# Patient Record
Sex: FEMALE | Race: WHITE | NOT HISPANIC OR LATINO
[De-identification: names, ages, dates, MRNs, and addresses within clinical notes are randomized per-mention and may not be internally consistent; named-entity substitution may affect disease eponyms.]

---

## 2018-08-09 PROBLEM — Z00.00 ENCOUNTER FOR PREVENTIVE HEALTH EXAMINATION: Status: ACTIVE | Noted: 2018-08-09

## 2018-08-15 ENCOUNTER — TRANSCRIPTION ENCOUNTER (OUTPATIENT)
Age: 68
End: 2018-08-15

## 2018-08-16 ENCOUNTER — OUTPATIENT (OUTPATIENT)
Dept: OUTPATIENT SERVICES | Facility: HOSPITAL | Age: 68
LOS: 1 days | End: 2018-08-16
Payer: MEDICARE

## 2018-08-16 ENCOUNTER — TRANSCRIPTION ENCOUNTER (OUTPATIENT)
Age: 68
End: 2018-08-16

## 2018-08-16 ENCOUNTER — RESULT REVIEW (OUTPATIENT)
Age: 68
End: 2018-08-16

## 2018-08-16 DIAGNOSIS — R19.7 DIARRHEA, UNSPECIFIED: ICD-10-CM

## 2018-08-16 DIAGNOSIS — K21.0 GASTRO-ESOPHAGEAL REFLUX DISEASE WITH ESOPHAGITIS: ICD-10-CM

## 2018-08-16 PROCEDURE — 88305 TISSUE EXAM BY PATHOLOGIST: CPT

## 2018-08-16 PROCEDURE — 43239 EGD BIOPSY SINGLE/MULTIPLE: CPT

## 2018-08-16 PROCEDURE — 88313 SPECIAL STAINS GROUP 2: CPT

## 2018-08-16 PROCEDURE — 45378 DIAGNOSTIC COLONOSCOPY: CPT

## 2018-08-16 PROCEDURE — 88312 SPECIAL STAINS GROUP 1: CPT | Mod: 26

## 2018-08-16 PROCEDURE — 88305 TISSUE EXAM BY PATHOLOGIST: CPT | Mod: 26

## 2018-08-16 PROCEDURE — 88312 SPECIAL STAINS GROUP 1: CPT

## 2018-08-16 PROCEDURE — 88313 SPECIAL STAINS GROUP 2: CPT | Mod: 26

## 2018-08-17 LAB — SURGICAL PATHOLOGY FINAL REPORT - CH: SIGNIFICANT CHANGE UP

## 2019-04-20 ENCOUNTER — TRANSCRIPTION ENCOUNTER (OUTPATIENT)
Age: 69
End: 2019-04-20

## 2019-12-06 ENCOUNTER — INPATIENT (INPATIENT)
Facility: HOSPITAL | Age: 69
LOS: 5 days | Discharge: HOME | DRG: 373 | End: 2019-12-12
Attending: FAMILY MEDICINE | Admitting: STUDENT IN AN ORGANIZED HEALTH CARE EDUCATION/TRAINING PROGRAM
Payer: MEDICARE

## 2019-12-06 VITALS
SYSTOLIC BLOOD PRESSURE: 177 MMHG | DIASTOLIC BLOOD PRESSURE: 77 MMHG | HEART RATE: 101 BPM | OXYGEN SATURATION: 98 % | TEMPERATURE: 98 F | RESPIRATION RATE: 18 BRPM | WEIGHT: 169.98 LBS

## 2019-12-06 LAB
ALBUMIN SERPL ELPH-MCNC: 4 G/DL — SIGNIFICANT CHANGE UP (ref 3.3–5)
ALT FLD-CCNC: 28 U/L — SIGNIFICANT CHANGE UP (ref 12–78)
ANION GAP SERPL CALC-SCNC: 5 MMOL/L — SIGNIFICANT CHANGE UP (ref 5–17)
APTT BLD: 27.4 SEC — LOW (ref 28.5–37)
AST SERPL-CCNC: 21 U/L — SIGNIFICANT CHANGE UP (ref 15–37)
BUN SERPL-MCNC: 18 MG/DL — SIGNIFICANT CHANGE UP (ref 7–23)
CALCIUM SERPL-MCNC: 8.5 MG/DL — SIGNIFICANT CHANGE UP (ref 8.5–10.1)
CHLORIDE SERPL-SCNC: 110 MMOL/L — HIGH (ref 96–108)
CO2 SERPL-SCNC: 28 MMOL/L — SIGNIFICANT CHANGE UP (ref 22–31)
CREAT SERPL-MCNC: 1.2 MG/DL — SIGNIFICANT CHANGE UP (ref 0.5–1.3)
GLUCOSE SERPL-MCNC: 115 MG/DL — HIGH (ref 70–99)
HCT VFR BLD CALC: 42.5 % — SIGNIFICANT CHANGE UP (ref 34.5–45)
HGB BLD-MCNC: 14 G/DL — SIGNIFICANT CHANGE UP (ref 11.5–15.5)
INR BLD: 0.95 RATIO — SIGNIFICANT CHANGE UP (ref 0.88–1.16)
MCHC RBC-ENTMCNC: 31.1 PG — SIGNIFICANT CHANGE UP (ref 27–34)
MCHC RBC-ENTMCNC: 32.9 GM/DL — SIGNIFICANT CHANGE UP (ref 32–36)
MCV RBC AUTO: 94.4 FL — SIGNIFICANT CHANGE UP (ref 80–100)
NRBC # BLD: 0 /100 WBCS — SIGNIFICANT CHANGE UP (ref 0–0)
PLATELET # BLD AUTO: 271 K/UL — SIGNIFICANT CHANGE UP (ref 150–400)
POTASSIUM SERPL-MCNC: 3.9 MMOL/L — SIGNIFICANT CHANGE UP (ref 3.5–5.3)
POTASSIUM SERPL-SCNC: 3.9 MMOL/L — SIGNIFICANT CHANGE UP (ref 3.5–5.3)
PROTHROM AB SERPL-ACNC: 10.7 SEC — SIGNIFICANT CHANGE UP (ref 10–12.9)
RBC # BLD: 4.5 M/UL — SIGNIFICANT CHANGE UP (ref 3.8–5.2)
RBC # FLD: 13.5 % — SIGNIFICANT CHANGE UP (ref 10.3–14.5)
SODIUM SERPL-SCNC: 143 MMOL/L — SIGNIFICANT CHANGE UP (ref 135–145)
WBC # BLD: 9.73 K/UL — SIGNIFICANT CHANGE UP (ref 3.8–10.5)
WBC # FLD AUTO: 9.73 K/UL — SIGNIFICANT CHANGE UP (ref 3.8–10.5)

## 2019-12-06 PROCEDURE — 71045 X-RAY EXAM CHEST 1 VIEW: CPT | Mod: 26

## 2019-12-06 PROCEDURE — 93010 ELECTROCARDIOGRAM REPORT: CPT

## 2019-12-06 RX ORDER — SODIUM CHLORIDE 9 MG/ML
1000 INJECTION INTRAMUSCULAR; INTRAVENOUS; SUBCUTANEOUS ONCE
Refills: 0 | Status: COMPLETED | OUTPATIENT
Start: 2019-12-06 | End: 2019-12-06

## 2019-12-06 RX ADMIN — SODIUM CHLORIDE 1000 MILLILITER(S): 9 INJECTION INTRAMUSCULAR; INTRAVENOUS; SUBCUTANEOUS at 23:48

## 2019-12-06 RX ADMIN — SODIUM CHLORIDE 1000 MILLILITER(S): 9 INJECTION INTRAMUSCULAR; INTRAVENOUS; SUBCUTANEOUS at 22:30

## 2019-12-06 RX ADMIN — SODIUM CHLORIDE 1000 MILLILITER(S): 9 INJECTION INTRAMUSCULAR; INTRAVENOUS; SUBCUTANEOUS at 23:46

## 2019-12-06 NOTE — ED PROVIDER NOTE - CLINICAL SUMMARY MEDICAL DECISION MAKING FREE TEXT BOX
pt is a 68 yo female hx of ibs and frequent episodes of syncope after eating and getting cramps,  pt today ate and with severe abdominal cramps, nausea. pt went to sit on toilet which is typical for pt and then got diffuse sweats, dizziness and then layed head against wall and had syncope with no trauma. pt then had diarrhea with scant mucous and blood and vomitted several times. pt with continued abd cramps. which is not typical for patient.  sx usually resolve,  no cp,sobk ha.  check ekg, labs, ivf,  check ct abd to ro colitis,  ce x 1

## 2019-12-06 NOTE — ED ADULT NURSE NOTE - OBJECTIVE STATEMENT
Received from Home with c/o one episode of Vomiting, bloody diarrhea and near syncopal episode at a restaurant. No [place], LOC. Hx of IBS on meds. AO4, Ambulatory.  at bedside. On monitor. EKG Done. Line and labs done. Skin intact.

## 2019-12-06 NOTE — ED PROVIDER NOTE - OBJECTIVE STATEMENT
pt is a 68 yo female hx of ibs and frequent episodes of syncope after eating and getting cramps,  pt today ate and with severe abdominal cramps, nausea. pt went to sit on toilet which is typical for pt and then got diffuse sweats, dizziness and then layed head against wall and had syncope with no trauma. pt then had diarrhea with scant mucous and blood and vomitted several times. pt with continued abd cramps. which is not typical for patient.  sx usually resolve,    pmd; jennifer

## 2019-12-06 NOTE — ED PROVIDER NOTE - FAMILY HISTORY
Mother  Still living? No  Family history of COPD (chronic obstructive pulmonary disease), Age at diagnosis: Age Unknown     Father  Still living? No  Family history of acute renal failure, Age at diagnosis: Age Unknown     Sibling  Still living? Yes, Estimated age: 51-60  Family history of systemic lupus erythematosus, Age at diagnosis: 51-60

## 2019-12-06 NOTE — ED PROVIDER NOTE - CARE PLAN
Principal Discharge DX:	Vasovagal syncope  Secondary Diagnosis:	Abdominal pain Principal Discharge DX:	Vasovagal syncope  Secondary Diagnosis:	Abdominal pain  Secondary Diagnosis:	GIB (gastrointestinal bleeding)

## 2019-12-06 NOTE — ED ADULT TRIAGE NOTE - CHIEF COMPLAINT QUOTE
patient came in ED from home with c/o an episode of bloody diarrhea, diaphoresis and syncope X this evening.

## 2019-12-06 NOTE — ED PROVIDER NOTE - CONSTITUTIONAL, MLM
normal... Well appearing, awake, alert, oriented to person, place, time/situation and in mild distress with abd cramps

## 2019-12-07 DIAGNOSIS — F41.9 ANXIETY DISORDER, UNSPECIFIED: ICD-10-CM

## 2019-12-07 DIAGNOSIS — Z71.89 OTHER SPECIFIED COUNSELING: ICD-10-CM

## 2019-12-07 DIAGNOSIS — R93.89 ABNORMAL FINDINGS ON DIAGNOSTIC IMAGING OF OTHER SPECIFIED BODY STRUCTURES: ICD-10-CM

## 2019-12-07 DIAGNOSIS — I10 ESSENTIAL (PRIMARY) HYPERTENSION: ICD-10-CM

## 2019-12-07 DIAGNOSIS — Z98.890 OTHER SPECIFIED POSTPROCEDURAL STATES: Chronic | ICD-10-CM

## 2019-12-07 DIAGNOSIS — E03.9 HYPOTHYROIDISM, UNSPECIFIED: ICD-10-CM

## 2019-12-07 DIAGNOSIS — R55 SYNCOPE AND COLLAPSE: ICD-10-CM

## 2019-12-07 DIAGNOSIS — K92.2 GASTROINTESTINAL HEMORRHAGE, UNSPECIFIED: ICD-10-CM

## 2019-12-07 DIAGNOSIS — Z29.9 ENCOUNTER FOR PROPHYLACTIC MEASURES, UNSPECIFIED: ICD-10-CM

## 2019-12-07 DIAGNOSIS — K58.9 IRRITABLE BOWEL SYNDROME WITHOUT DIARRHEA: ICD-10-CM

## 2019-12-07 DIAGNOSIS — K52.9 NONINFECTIVE GASTROENTERITIS AND COLITIS, UNSPECIFIED: ICD-10-CM

## 2019-12-07 LAB
ALBUMIN SERPL ELPH-MCNC: 3.9 G/DL — SIGNIFICANT CHANGE UP (ref 3.3–5)
ALP SERPL-CCNC: 91 U/L — SIGNIFICANT CHANGE UP (ref 40–120)
ALP SERPL-CCNC: 93 U/L — SIGNIFICANT CHANGE UP (ref 40–120)
ALT FLD-CCNC: 26 U/L — SIGNIFICANT CHANGE UP (ref 12–78)
ANION GAP SERPL CALC-SCNC: 5 MMOL/L — SIGNIFICANT CHANGE UP (ref 5–17)
ANION GAP SERPL CALC-SCNC: 6 MMOL/L — SIGNIFICANT CHANGE UP (ref 5–17)
AST SERPL-CCNC: 21 U/L — SIGNIFICANT CHANGE UP (ref 15–37)
BILIRUB SERPL-MCNC: 0.2 MG/DL — SIGNIFICANT CHANGE UP (ref 0.2–1.2)
BILIRUB SERPL-MCNC: 0.3 MG/DL — SIGNIFICANT CHANGE UP (ref 0.2–1.2)
BUN SERPL-MCNC: 13 MG/DL — SIGNIFICANT CHANGE UP (ref 7–23)
BUN SERPL-MCNC: 19 MG/DL — SIGNIFICANT CHANGE UP (ref 7–23)
CALCIUM SERPL-MCNC: 8 MG/DL — LOW (ref 8.5–10.1)
CALCIUM SERPL-MCNC: 8.2 MG/DL — LOW (ref 8.5–10.1)
CHLORIDE SERPL-SCNC: 110 MMOL/L — HIGH (ref 96–108)
CHLORIDE SERPL-SCNC: 112 MMOL/L — HIGH (ref 96–108)
CK MB BLD-MCNC: <1.3 % — SIGNIFICANT CHANGE UP (ref 0–3.5)
CK MB CFR SERPL CALC: <1 NG/ML — SIGNIFICANT CHANGE UP (ref 0–3.6)
CK SERPL-CCNC: 78 U/L — SIGNIFICANT CHANGE UP (ref 26–192)
CO2 SERPL-SCNC: 27 MMOL/L — SIGNIFICANT CHANGE UP (ref 22–31)
CO2 SERPL-SCNC: 27 MMOL/L — SIGNIFICANT CHANGE UP (ref 22–31)
CREAT SERPL-MCNC: 0.96 MG/DL — SIGNIFICANT CHANGE UP (ref 0.5–1.3)
CREAT SERPL-MCNC: 1.2 MG/DL — SIGNIFICANT CHANGE UP (ref 0.5–1.3)
GLUCOSE SERPL-MCNC: 113 MG/DL — HIGH (ref 70–99)
GLUCOSE SERPL-MCNC: 124 MG/DL — HIGH (ref 70–99)
HCT VFR BLD CALC: 38.8 % — SIGNIFICANT CHANGE UP (ref 34.5–45)
HCT VFR BLD CALC: 39 % — SIGNIFICANT CHANGE UP (ref 34.5–45)
HGB BLD-MCNC: 12.7 G/DL — SIGNIFICANT CHANGE UP (ref 11.5–15.5)
HGB BLD-MCNC: 12.8 G/DL — SIGNIFICANT CHANGE UP (ref 11.5–15.5)
LACTATE SERPL-SCNC: 0.7 MMOL/L — SIGNIFICANT CHANGE UP (ref 0.7–2)
LIDOCAIN IGE QN: 170 U/L — SIGNIFICANT CHANGE UP (ref 73–393)
MCHC RBC-ENTMCNC: 31.1 PG — SIGNIFICANT CHANGE UP (ref 27–34)
MCHC RBC-ENTMCNC: 32.6 GM/DL — SIGNIFICANT CHANGE UP (ref 32–36)
MCV RBC AUTO: 95.6 FL — SIGNIFICANT CHANGE UP (ref 80–100)
NRBC # BLD: 0 /100 WBCS — SIGNIFICANT CHANGE UP (ref 0–0)
PLATELET # BLD AUTO: 255 K/UL — SIGNIFICANT CHANGE UP (ref 150–400)
POTASSIUM SERPL-MCNC: 3.8 MMOL/L — SIGNIFICANT CHANGE UP (ref 3.5–5.3)
POTASSIUM SERPL-MCNC: 4.3 MMOL/L — SIGNIFICANT CHANGE UP (ref 3.5–5.3)
POTASSIUM SERPL-SCNC: 3.8 MMOL/L — SIGNIFICANT CHANGE UP (ref 3.5–5.3)
POTASSIUM SERPL-SCNC: 4.3 MMOL/L — SIGNIFICANT CHANGE UP (ref 3.5–5.3)
PROT SERPL-MCNC: 7.3 G/DL — SIGNIFICANT CHANGE UP (ref 6–8.3)
PROT SERPL-MCNC: 7.4 G/DL — SIGNIFICANT CHANGE UP (ref 6–8.3)
RBC # BLD: 4.08 M/UL — SIGNIFICANT CHANGE UP (ref 3.8–5.2)
RBC # FLD: 13.3 % — SIGNIFICANT CHANGE UP (ref 10.3–14.5)
SODIUM SERPL-SCNC: 142 MMOL/L — SIGNIFICANT CHANGE UP (ref 135–145)
SODIUM SERPL-SCNC: 145 MMOL/L — SIGNIFICANT CHANGE UP (ref 135–145)
TROPONIN I SERPL-MCNC: <.015 NG/ML — SIGNIFICANT CHANGE UP (ref 0.01–0.04)
WBC # BLD: 7.71 K/UL — SIGNIFICANT CHANGE UP (ref 3.8–10.5)
WBC # FLD AUTO: 7.71 K/UL — SIGNIFICANT CHANGE UP (ref 3.8–10.5)

## 2019-12-07 PROCEDURE — 74177 CT ABD & PELVIS W/CONTRAST: CPT | Mod: 26

## 2019-12-07 PROCEDURE — 99285 EMERGENCY DEPT VISIT HI MDM: CPT

## 2019-12-07 RX ORDER — MORPHINE SULFATE 50 MG/1
1 CAPSULE, EXTENDED RELEASE ORAL EVERY 4 HOURS
Refills: 0 | Status: DISCONTINUED | OUTPATIENT
Start: 2019-12-07 | End: 2019-12-12

## 2019-12-07 RX ORDER — PANTOPRAZOLE SODIUM 20 MG/1
40 TABLET, DELAYED RELEASE ORAL
Refills: 0 | Status: DISCONTINUED | OUTPATIENT
Start: 2019-12-07 | End: 2019-12-12

## 2019-12-07 RX ORDER — ONDANSETRON 8 MG/1
4 TABLET, FILM COATED ORAL EVERY 6 HOURS
Refills: 0 | Status: DISCONTINUED | OUTPATIENT
Start: 2019-12-07 | End: 2019-12-12

## 2019-12-07 RX ORDER — ONDANSETRON 8 MG/1
4 TABLET, FILM COATED ORAL ONCE
Refills: 0 | Status: DISCONTINUED | OUTPATIENT
Start: 2019-12-07 | End: 2019-12-07

## 2019-12-07 RX ORDER — ONDANSETRON 8 MG/1
4 TABLET, FILM COATED ORAL ONCE
Refills: 0 | Status: COMPLETED | OUTPATIENT
Start: 2019-12-07 | End: 2019-12-07

## 2019-12-07 RX ORDER — AMLODIPINE BESYLATE 2.5 MG/1
2.5 TABLET ORAL DAILY
Refills: 0 | Status: DISCONTINUED | OUTPATIENT
Start: 2019-12-07 | End: 2019-12-12

## 2019-12-07 RX ORDER — BUPROPION HYDROCHLORIDE 150 MG/1
300 TABLET, EXTENDED RELEASE ORAL DAILY
Refills: 0 | Status: DISCONTINUED | OUTPATIENT
Start: 2019-12-07 | End: 2019-12-12

## 2019-12-07 RX ORDER — SODIUM CHLORIDE 9 MG/ML
1000 INJECTION INTRAMUSCULAR; INTRAVENOUS; SUBCUTANEOUS ONCE
Refills: 0 | Status: COMPLETED | OUTPATIENT
Start: 2019-12-07 | End: 2019-12-07

## 2019-12-07 RX ORDER — SODIUM CHLORIDE 9 MG/ML
1000 INJECTION, SOLUTION INTRAVENOUS
Refills: 0 | Status: DISCONTINUED | OUTPATIENT
Start: 2019-12-07 | End: 2019-12-08

## 2019-12-07 RX ORDER — MORPHINE SULFATE 50 MG/1
1 CAPSULE, EXTENDED RELEASE ORAL EVERY 4 HOURS
Refills: 0 | Status: DISCONTINUED | OUTPATIENT
Start: 2019-12-07 | End: 2019-12-07

## 2019-12-07 RX ORDER — METRONIDAZOLE 500 MG
500 TABLET ORAL ONCE
Refills: 0 | Status: COMPLETED | OUTPATIENT
Start: 2019-12-07 | End: 2019-12-07

## 2019-12-07 RX ORDER — METRONIDAZOLE 500 MG
500 TABLET ORAL EVERY 8 HOURS
Refills: 0 | Status: DISCONTINUED | OUTPATIENT
Start: 2019-12-07 | End: 2019-12-12

## 2019-12-07 RX ORDER — AMLODIPINE BESYLATE 2.5 MG/1
1 TABLET ORAL
Qty: 0 | Refills: 0 | DISCHARGE

## 2019-12-07 RX ORDER — BUPROPION HYDROCHLORIDE 150 MG/1
300 TABLET, EXTENDED RELEASE ORAL
Qty: 0 | Refills: 0 | DISCHARGE

## 2019-12-07 RX ORDER — SODIUM CHLORIDE 9 MG/ML
1000 INJECTION INTRAMUSCULAR; INTRAVENOUS; SUBCUTANEOUS
Refills: 0 | Status: DISCONTINUED | OUTPATIENT
Start: 2019-12-07 | End: 2019-12-07

## 2019-12-07 RX ORDER — LEVOTHYROXINE SODIUM 125 MCG
1 TABLET ORAL
Qty: 0 | Refills: 0 | DISCHARGE

## 2019-12-07 RX ORDER — CEFTRIAXONE 500 MG/1
2000 INJECTION, POWDER, FOR SOLUTION INTRAMUSCULAR; INTRAVENOUS EVERY 24 HOURS
Refills: 0 | Status: DISCONTINUED | OUTPATIENT
Start: 2019-12-07 | End: 2019-12-12

## 2019-12-07 RX ORDER — MORPHINE SULFATE 50 MG/1
2 CAPSULE, EXTENDED RELEASE ORAL ONCE
Refills: 0 | Status: DISCONTINUED | OUTPATIENT
Start: 2019-12-07 | End: 2019-12-07

## 2019-12-07 RX ORDER — METRONIDAZOLE 500 MG
TABLET ORAL
Refills: 0 | Status: DISCONTINUED | OUTPATIENT
Start: 2019-12-07 | End: 2019-12-12

## 2019-12-07 RX ORDER — LEVOTHYROXINE SODIUM 125 MCG
75 TABLET ORAL DAILY
Refills: 0 | Status: DISCONTINUED | OUTPATIENT
Start: 2019-12-07 | End: 2019-12-12

## 2019-12-07 RX ORDER — ONDANSETRON 8 MG/1
4 TABLET, FILM COATED ORAL EVERY 6 HOURS
Refills: 0 | Status: DISCONTINUED | OUTPATIENT
Start: 2019-12-07 | End: 2019-12-07

## 2019-12-07 RX ADMIN — SODIUM CHLORIDE 75 MILLILITER(S): 9 INJECTION, SOLUTION INTRAVENOUS at 11:08

## 2019-12-07 RX ADMIN — Medication 100 MILLIGRAM(S): at 06:00

## 2019-12-07 RX ADMIN — AMLODIPINE BESYLATE 2.5 MILLIGRAM(S): 2.5 TABLET ORAL at 06:56

## 2019-12-07 RX ADMIN — ONDANSETRON 4 MILLIGRAM(S): 8 TABLET, FILM COATED ORAL at 06:56

## 2019-12-07 RX ADMIN — CEFTRIAXONE 100 MILLIGRAM(S): 500 INJECTION, POWDER, FOR SOLUTION INTRAMUSCULAR; INTRAVENOUS at 05:29

## 2019-12-07 RX ADMIN — MORPHINE SULFATE 1 MILLIGRAM(S): 50 CAPSULE, EXTENDED RELEASE ORAL at 14:23

## 2019-12-07 RX ADMIN — Medication 100 MILLIGRAM(S): at 14:18

## 2019-12-07 RX ADMIN — SODIUM CHLORIDE 75 MILLILITER(S): 9 INJECTION INTRAMUSCULAR; INTRAVENOUS; SUBCUTANEOUS at 05:36

## 2019-12-07 RX ADMIN — MORPHINE SULFATE 2 MILLIGRAM(S): 50 CAPSULE, EXTENDED RELEASE ORAL at 03:17

## 2019-12-07 RX ADMIN — SODIUM CHLORIDE 1000 MILLILITER(S): 9 INJECTION INTRAMUSCULAR; INTRAVENOUS; SUBCUTANEOUS at 03:17

## 2019-12-07 RX ADMIN — SODIUM CHLORIDE 1000 MILLILITER(S): 9 INJECTION INTRAMUSCULAR; INTRAVENOUS; SUBCUTANEOUS at 00:49

## 2019-12-07 RX ADMIN — Medication 10 MILLIGRAM(S): at 08:27

## 2019-12-07 RX ADMIN — ONDANSETRON 4 MILLIGRAM(S): 8 TABLET, FILM COATED ORAL at 03:16

## 2019-12-07 RX ADMIN — Medication 10 MILLIGRAM(S): at 06:55

## 2019-12-07 RX ADMIN — MORPHINE SULFATE 1 MILLIGRAM(S): 50 CAPSULE, EXTENDED RELEASE ORAL at 08:29

## 2019-12-07 RX ADMIN — ONDANSETRON 4 MILLIGRAM(S): 8 TABLET, FILM COATED ORAL at 14:23

## 2019-12-07 RX ADMIN — Medication 10 MILLIGRAM(S): at 16:21

## 2019-12-07 RX ADMIN — MORPHINE SULFATE 1 MILLIGRAM(S): 50 CAPSULE, EXTENDED RELEASE ORAL at 14:32

## 2019-12-07 RX ADMIN — BUPROPION HYDROCHLORIDE 300 MILLIGRAM(S): 150 TABLET, EXTENDED RELEASE ORAL at 11:08

## 2019-12-07 RX ADMIN — Medication 100 MILLIGRAM(S): at 21:28

## 2019-12-07 RX ADMIN — MORPHINE SULFATE 1 MILLIGRAM(S): 50 CAPSULE, EXTENDED RELEASE ORAL at 09:01

## 2019-12-07 RX ADMIN — Medication 75 MICROGRAM(S): at 06:56

## 2019-12-07 NOTE — H&P ADULT - NSHPOUTPATIENTPROVIDERS_GEN_ALL_CORE
PMD: Dr Zi Melgoza   Gastroenterologist:  PMD: Dr Zi Melgoza   Gastroenterologist: Dr Rossi  Cardio: Dr Mario

## 2019-12-07 NOTE — H&P ADULT - PROBLEM SELECTOR PLAN 1
Patient with bright red blood per rectum since last night, no known history of GI bleeds previously, last EGD/colonoscopy 2 years ago wnl, patient had two more episodes of BRBPR in ED (about 5-10 mL with some small amount of stool)  -Admit to telemetry   -s/p 3 L NS bolus in ED   -c/w fluids?   -PPI?   -Pain control: s/p Morphine 2 mg IVP x 1, continue   -Zofran PRN for nausea   -H/H: 14/42.5, unknown baseline, patient is hemodynamically stable   -Transfuse if Hgb < 8, or if patient becomes hemodynamically unstable   -f/u FOBT   -f/u AM CBC   -Patient will likely need colonoscopy/EGD, keep patient NPO in setting of GI bleed   -GI (Jorge) consulted; f/u recs -Patient with bright red blood per rectum since last night, no known history of GI bleeds previously  -ischemic vs infectious colitis vs ibs flare  - CT with findings of uncomplicated colitis  -pt with no gib with previous ibs flares  -last EGD/colonoscopy 2 years ago wnl  -patient had two more episodes of BRBPR in ED (about 5-10 mL with some small amount of stool)  -Admit to telemetry   - NPO   - type and screen, H&H currently 14/42.5, check stat H&H , check q 6 hours while actively bleeding.   - transfuse PRN for significant bleeding or hemodynamic instability.  -s/p 3 L NS bolus in ED , c/w NS @ 75cc x 12 hrs  -Pain control: s/p Morphine 2 mg IVP x 1. hold for now. restart bentyl. add morphine if pain inadequately controlled.   -Zofran PRN for nausea   -H/H: 14/42.5, unknown baseline, patient is hemodynamically stable   -Patient may need colonoscopy/EGD  -GI (Jorge) consulted; f/u recs

## 2019-12-07 NOTE — H&P ADULT - NSICDXPASTSURGICALHX_GEN_ALL_CORE_FT
PAST SURGICAL HISTORY:  History of colonoscopy     S/P arthroscopic knee surgery Left knee (1995)    S/P hernia repair

## 2019-12-07 NOTE — H&P ADULT - ASSESSMENT
Patient is a 70 y/o female with PMH of IBS and frequent vasovagal syncopal episodes, presenting with, admitted for management of GI bleed. Patient is a 68 y/o female with PMH of IBS, frequent vasovagal syncope after bowel movements, HTN, hypothyroidism. generalized anxiety disorder who presents c/o bright red blood per rectum since last night, admitted for management of GI bleed.

## 2019-12-07 NOTE — H&P ADULT - NSHPREVIEWOFSYSTEMS_GEN_ALL_CORE
CONSTITUTIONAL: denies fever, chills, fatigue, weakness  HEENT: denies blurred vision, sore throat  SKIN: denies new lesions, rash  CARDIOVASCULAR: denies chest pain, chest pressure, palpitations  RESPIRATORY: denies shortness of breath, sputum production  GASTROINTESTINAL: admits nausea, vomiting, bloody diarrhea, abdominal pain  GENITOURINARY: denies dysuria, discharge  NEUROLOGICAL: denies numbness, headache, focal weakness  MUSCULOSKELETAL: denies new joint pain, muscle aches  HEMATOLOGIC: denies gross bleeding, bruising  LYMPHATICS: denies enlarged lymph nodes, extremity swelling

## 2019-12-07 NOTE — H&P ADULT - PROBLEM SELECTOR PLAN 2
Patient with history of vasovagal syncope with bowel movements, has followed up with her cardiologist Dr Mario for workup of vasovagal syncope   -Syncope with this episode is most likely vasovagal, as it occurred while patient was straining herself while having a bowel movement, patient also with prodromal symptoms   -EKG wnl, Cardiac enzymes negative x 1, patient denies anginal symptoms: unlikely ischemic event   -Monitor for signs of ischemia Patient with history of vasovagal syncope with bowel movements, has followed up with her cardiologist Dr Mario for workup of vasovagal syncope   -Syncope with this episode is most likely vasovagal, as it occurred while patient was straining herself while having a bowel movement, patient also with prodromal symptoms   -EKG wnl, Cardiac enzymes negative x 1, patient denies anginal symptoms: unlikely ischemic event   -Monitor for signs of ischemia  - fall precautions, oob with assist

## 2019-12-07 NOTE — CONSULT NOTE ADULT - PROBLEM SELECTOR RECOMMENDATION 9
- ischemic vs infectious etiology   - agree with IV abx (cipro/flagyl)  - check stool studies (gi pcr)  - if diarrhea persists/worsens would check stool for C diff  - pt will need a colonoscopy in 6-8 weeks once resolution of infection/inflammation to r/o underlying malignancy  - clear liquid diet, will advance as tolerated

## 2019-12-07 NOTE — H&P ADULT - NSHPPHYSICALEXAM_GEN_ALL_CORE
T(C): 36.9 (12-06-19 @ 22:30), Max: 36.9 (12-06-19 @ 22:30)  HR: 103 (12-07-19 @ 03:00) (94 - 103)  BP: 132/75 (12-07-19 @ 03:00) (132/75 - 177/77)  RR: 16 (12-07-19 @ 03:00) (16 - 18)  SpO2: 97% (12-07-19 @ 03:00) (97% - 99%)    GENERAL: patient appears well, no acute distress, appropriate, pleasant  EYES: sclera clear, no exudates  ENMT: oropharynx clear without erythema, no exudates, moist mucous membranes  NECK: supple, soft, no thyromegaly noted  LUNGS: good air entry bilaterally, clear to auscultation, symmetric breath sounds, no wheezing or rhonchi appreciated  HEART: soft S1/S2, slightly tachycardic, no murmurs noted, no lower extremity edema  GASTROINTESTINAL: abdomen is soft, mild diffuse TTP, nondistended, normoactive bowel sounds, no palpable masses  INTEGUMENT: good skin turgor, warm skin, appears well perfused  MUSCULOSKELETAL: no clubbing or cyanosis, no obvious deformity  NEUROLOGIC: awake, alert, oriented x3, good muscle tone in 4 extremities, no obvious sensory deficits  PSYCHIATRIC: mood is good, affect is congruent, linear and logical thought process  HEME/LYMPH: no palpable supraclavicular nodules, no obvious ecchymosis or petechiae

## 2019-12-07 NOTE — H&P ADULT - PROBLEM SELECTOR PLAN 4
Patient with h/o IBS   -c/w dicyclomine 10 mg TID for intestinal spasms Patient with h/o HTN   -c/w Norvasc 2.5 mg daily

## 2019-12-07 NOTE — H&P ADULT - PROBLEM SELECTOR PLAN 8
-Prominent endometrial echo complex measuring 1.5 cm  -Discuss with patient and advise her to see GYN for further evaluation   - Continued faint hyperdensity along the fundal portion of the gallbladder, likely represent adenomyomatosis  -Consider outpatient workup for gallstones with RUQ U/S, patient currently not c/o symptoms of biliary colic VTE ppx: SCDs, encourage ambulation, patient with active GI bleed

## 2019-12-07 NOTE — H&P ADULT - PROBLEM SELECTOR PLAN 6
Patient with h/o hypothyroidism   -c/w Synthroid 75 mcg daily Patient with h/o EITAN   -c/w Wellbutrin 300 mg qd

## 2019-12-07 NOTE — CONSULT NOTE ADULT - SUBJECTIVE AND OBJECTIVE BOX
Chief Complaint:  Patient is a 69y old  Female who presents with a chief complaint of GI bleed (07 Dec 2019 03:26)      HPI: Patient is a 68 y/o female with PMH of IBS, frequent vasovagal syncope after bowel movements, HTN, hypothyroidism. generalized anxiety disorder who presents c/o bright red blood per rectum since last night. Patient reports that she had eaten dinner and started to feel crampy abdominal pain, nausea, and felt the sensation of wanting to have a bowel movement. She went to the toilet and before she had a bowel movement, started to have diaphoresis with worsening cramping abdominal pain. She then synopsized for an unknown amount of time (no trauma to her head or body, patient leaned against the wall while she was sitting on the toilet) and woke up vomiting. Her vomit was NBNB, only food particles noted. Patient then said she developed diarrhea with mucus with a small amount of bright red blood. She became concerned when she kept having bloody bowel movements, at which point she decided to come to the ER. Patient reports that the symptoms of having crampy abdominal pain, nausea, syncope, vomiting, and diarrhea with mucus is her normal prodrome when she has vasovagal episodes with bowel movements, but that this is the first time she has ever had any blood in her stool. Her last colonoscopy and EGD was 2 years ago with Dr. Amanda; as per patient results were normal. She was put on dicyclomine for spasms of the intestine associated with her IBS; she reports that she has not had these vasovagal episodes as frequently since she was put on this medication.     GI consulted, on admission CT imaging concerning for uncomplicated colitis. At time of examination, pt admits to diffuse crampy abdominal pain worse in lower abdomen in addition to frequent loose bloody stools. Pt denies n/v, recent travel, c diff in the past.       Allergies:  Cipro (Hives)  codeine (Vomiting)  tramadol (Vomiting)      Medications:  amLODIPine   Tablet 2.5 milliGRAM(s) Oral daily  buPROPion XL . 300 milliGRAM(s) Oral daily  cefTRIAXone   IVPB 2000 milliGRAM(s) IV Intermittent every 24 hours  dextrose 5% + sodium chloride 0.9%. 1000 milliLiter(s) IV Continuous <Continuous>  dicyclomine 10 milliGRAM(s) Oral three times a day before meals PRN  levothyroxine 75 MICROGram(s) Oral daily  metroNIDAZOLE  IVPB      metroNIDAZOLE  IVPB 500 milliGRAM(s) IV Intermittent every 8 hours  morphine  - Injectable 1 milliGRAM(s) IV Push every 4 hours PRN  ondansetron Injectable 4 milliGRAM(s) IV Push every 6 hours PRN      PMHX/PSHX:  Anxiety disorder  Hypothyroidism  HTN (hypertension)  IBS (irritable bowel syndrome)  Seasonal allergies  S/P hernia repair  History of colonoscopy  S/P arthroscopic knee surgery      Family history:  Family history of systemic lupus erythematosus (Sibling)  Family history of acute renal failure  Family history of COPD (chronic obstructive pulmonary disease)      Social History: non- toxic habits   Former smoker, ages [age], smoked 5-8 cigarettes daily   social etoh use    ROS:     General:  No wt loss, fevers, chills, night sweats, fatigue,   Eyes:  Good vision, no reported pain  ENT:  No sore throat, pain, runny nose, dysphagia  CV:  No pain, palpitations, hypo/hypertension  Resp:  No dyspnea, cough, tachypnea, wheezing  GI: see HPI  :  No pain, bleeding, incontinence, nocturia  Muscle:  No pain, weakness  Neuro:  No weakness, tingling, memory problems  Psych:  No fatigue, insomnia, mood problems, depression  Endocrine:  No polyuria, polydipsia, cold/heat intolerance  Heme:  No petechiae, ecchymosis, easy bruisability  Skin:  No rash, tattoos, scars, edema      PHYSICAL EXAM:   Vital Signs:  Vital Signs Last 24 Hrs  T(C): 36.8 (07 Dec 2019 11:46), Max: 37.1 (07 Dec 2019 07:24)  T(F): 98.3 (07 Dec 2019 11:46), Max: 98.7 (07 Dec 2019 07:24)  HR: 78 (07 Dec 2019 11:46) (71 - 103)  BP: 108/67 (07 Dec 2019 11:46) (108/67 - 177/77)  BP(mean): --  RR: 18 (07 Dec 2019 11:46) (16 - 18)  SpO2: 97% (07 Dec 2019 11:46) (93% - 99%)  Daily     Daily Weight in k.4 (07 Dec 2019 06:00)    GENERAL:  Appears stated age, well-groomed, well-nourished, no distress  HEENT:  NC/AT,  conjunctivae clear and pink, no thyromegaly, nodules, adenopathy, no JVD, sclera -anicteric  CHEST:  Full & symmetric excursion, no increased effort, breath sounds clear  HEART:  Regular rhythm, S1, S2, no murmur/rub/S3/S4, no abdominal bruit, no edema  ABDOMEN:  Soft, non-tender, non-distended, normoactive bowel sounds,  no masses   EXTREMITIES:  no cyanosis, clubbing or edema  SKIN:  No rash/erythema/ecchymoses/petechiae/wounds/abscess/warm/dry  NEURO:  Alert, oriented, no asterixis, no tremor, no encephalopathy    LABS:                        12.8   x     )-----------( x        ( 07 Dec 2019 10:26 )             38.8     12    145  |  112<H>  |  13  ----------------------------<  124<H>  4.3   |  27  |  0.96    Ca    8.0<L>      07 Dec 2019 05:43    TPro  7.3  /  Alb  3.9  /  TBili  0.3  /  DBili  x   /  AST  21  /  ALT  26  /  AlkPhos  91  07    LIVER FUNCTIONS - ( 07 Dec 2019 00:57 )  Alb: 3.9 g/dL / Pro: 7.3 g/dL / ALK PHOS: 91 U/L / ALT: 26 U/L / AST: 21 U/L / GGT: x           PT/INR - ( 06 Dec 2019 23:40 )   PT: 10.7 sec;   INR: 0.95 ratio         PTT - ( 06 Dec 2019 23:40 )  PTT:27.4 sec    Amylase Serum--      Lipase [id number]       Ammonia--      Imaging:    < from: CT Abdomen and Pelvis w/ IV Cont (19 @ 01:28) >    EXAM:  CT ABDOMEN AND PELVIS IC                            PROCEDURE DATE:  2019          INTERPRETATION:  CT ABDOMEN AND PELVIS WITH CONTRAST    INDICATION: Abdomen pain. Evaluate for colitis.    TECHNIQUE: Contrast enhanced CT of the abdomen and pelvis. Images are   reformatted in the sagittal and coronal planes.    95 mL of Omnipaque 350 contrast material was injected IV.    COMPARISON: CT abdomen pelvis 2011.    FINDINGS:    Lower Thorax: No consolidation or effusion. Mild rightmiddle lobe linear   atelectasis and/or scarring.    Liver: No suspicious lesions.  Biliary: No dilatation. Continued faint hyperdensity along the fundal   portion of the gallbladder, likely represent adenomyomatosis. Consider   nonemergent correlation with right upper quadrant ultrasound to exclude   gallstone.  Spleen: No suspicious lesions.  Pancreas: No inflammatory changes or ductal dilatation.  Adrenals: Normal.  Kidneys: No hydronephrosis.  Vessels: Normal caliber. Atherosclerotic disease of the aorta and its   branches.    GI tract: No evidence of small bowel obstruction. There is wall   thickening of transverse and descending colon extending to the rectum,   concerning for colitis though detailed evaluation limited secondary to   inadequate distention. This is most pronounced at the level of the   descending colon with submucosal edema and trace pericolonic stranding.   Diverticulosis without evidence of acute diverticulitis. Normal appendix.    Peritoneum/retroperitoneum and mesentery: No free air. No organized fluid   collection. No adenopathy.    Pelvic organs/Bladder: Prominent endometrial echo complex measuring 1.5   cm. GYN consultation and direct visualization is advised to exclude   underlying malignancy. No adnexal masses. Bladder is inadequately   distended..    Abdominal wall: A small fat containing umbilical hernia is noted.  Bones and soft tissues: Multilevel degenerative changes of the spine are   noted with advanced degenerative disc disease at L4-L5 containing vacuum   phenomena. Mild anterolisthesis of L5 on S1.    IMPRESSION:    Findings concerning for uncomplicated colitis, most pronounced at the   level of the descending colonic loop though detailed evaluation is   limited secondary to inadequate distention. Consider nonemergent   colonoscopy evaluation once inflammation subsides to exclude underlying   malignancy. No bowel obstruction or extraluminal collection.    Prominent endometrial echo complex measuring 1.5 cm. GYN consultation and   direct visualization is advised to exclude underlying malignancy.     Continued faint hyperdensity along the fundal portion of the gallbladder,   likely represent adenomyomatosis. Consider nonemergent correlation with   right upper quadrant ultrasound toexclude gallstone.    Additional findings as mentioned above.                   CLIVE BROWNE M.D., ATTENDING RADIOLOGIST  This document has been electronically signed. Dec  7 2019  2:47AM

## 2019-12-07 NOTE — CONSULT NOTE ADULT - ASSESSMENT
68 y/o female with PMH of IBS, frequent vasovagal syncope after bowel movements, HTN, hypothyroidism. generalized anxiety disorder who presents c/o bright red blood per rectum since last night, CT imaging with concern for uncomplicated colitis. GI consulted.

## 2019-12-07 NOTE — H&P ADULT - PROBLEM SELECTOR PLAN 7
Patient with h/o EITAN   -c/w Wellbutrin 300 mg qd -Prominent endometrial echo complex measuring 1.5 cm  -Discuss with patient and advise her to see GYN for further evaluation   - Continued faint hyperdensity along the fundal portion of the gallbladder, likely represent adenomyomatosis  -Consider outpatient workup for gallstones with RUQ U/S, patient currently not c/o symptoms of biliary colic

## 2019-12-07 NOTE — H&P ADULT - NSHPSOCIALHISTORY_GEN_ALL_CORE
Tobacco: Former smoker, ages [age], smoked 5-8 cigarettes daily   ETOH: social drinker   Illicit drug use: denies     Patient lives at home with . Ambulates without assistance.   Flu vaccine in September   PNA vaccine a few weeks ago

## 2019-12-07 NOTE — ED ADULT NURSE REASSESSMENT NOTE - NS ED NURSE REASSESS COMMENT FT1
Pt resting in bed. Stated that she had another fresh bloody diarrhea, maybe an ounce of it. Will ctm. ER MD aware.

## 2019-12-07 NOTE — H&P ADULT - PROBLEM SELECTOR PLAN 3
Patient with evidence of uncomplicated colitis on CT, -Patient with evidence of uncomplicated colitis on CT  - ischemic vs infectious   - no leukocytosis. afebrile   - cover with rocephin and flagyl for now awaiting GI Eval

## 2019-12-07 NOTE — H&P ADULT - PROBLEM SELECTOR PLAN 5
Patient with h/o HTN   -c/w Norvasc 2.5 mg daily Patient with h/o hypothyroidism   -c/w Synthroid 75 mcg daily

## 2019-12-07 NOTE — H&P ADULT - HISTORY OF PRESENT ILLNESS
Patient is a 68 y/o female with PMH of IBS and multiple syncopal episodes after eating presents c/o       In the ED, ]  VS: T: 97.5, HR:101-->100, BP: 177/77--->147/80, RR: 18, SpO2: 98% on room air   Labs were significant for: H/H: 14/42.5, Serum glucose: 113, cardiac enzymes negative x 1   CT A/P: Findings concerning for uncomplicated colitis, most pronounced at the level of the descending colonic loop though detailed evaluation is limited secondary to inadequate distention. Consider nonemergent colonoscopy evaluation once inflammation subsides to exclude underlying malignancy. No bowel obstruction or extraluminal collection.Prominent endometrial echo complex measuring 1.5 cm. GYN consultation and direct visualization is advised to exclude underlying malignancy. Continued faint hyperdensity along the fundal portion of the gallbladder, likely represent adenomyomatosis. Consider nonemergent correlation with right upper quadrant ultrasound to exclude gallstone.  Patient received 3L NS bolus, morphine 2mg IVP x 1, Zofran 4 mg IVP x 1 Patient is a 68 y/o female with PMH of IBS, frequent vasovagal syncope after bowel movements, HTN, hypothyroidism. generalized anxiety disorder who presents c/o bright red blood per rectum since last night. Patient reports that she had eaten dinner and started to feel crampy abdominal pain, nausea, and felt the sensation of wanting to have a bowel movement. She went to the toilet and before she had a bowel movement, started to have diaphoresis with worsening cramping abdominal pain. She then syncopized for an unknown amount of time (no trauma to her head or body, patient leaned against the wall while she was sitting on the toilet) and woke up vomiting. Her vomit was NBNB, only food particles noted. Patient then said she developed diarrhea with mucus with a small amount of bright red blood. She became concerned when she kept having bloody bowel movements, at which point she decided to come to the ER. Patient reports that the symptoms of having crampy abdominal pain, nausea, syncope, vomiting, and diarrhea with mucus is her normal prodrome when she has vasovagal episodes with bowel movements, but that this is the first time she has ever had any blood in her stool. Her last colonoscopy and EGD was 2 years ago; as per patient results were normal. She was put on dicyclomine for spasms of the intestine associated with her IBS; she reports that she has not had these vasovagal episodes as frequently since she was put on this medication. Her last episodes was a few months ago. Patient denies fevers, chills, chest pain, shoulder pain, jaw pain, palpitations, SOB.       In the ED,   VS: T: 97.5, HR:101-->100, BP: 177/77--->147/80, RR: 18, SpO2: 98% on room air   Labs were significant for: H/H: 14/42.5, Serum glucose: 113, cardiac enzymes negative x 1   EKG: Normal sinus rhythm  CXR: official read pending, clear compared to previous   CT A/P: Findings concerning for uncomplicated colitis, most pronounced at the level of the descending colonic loop though detailed evaluation is limited secondary to inadequate distention. Consider nonemergent colonoscopy evaluation once inflammation subsides to exclude underlying malignancy. No bowel obstruction or extraluminal collection.Prominent endometrial echo complex measuring 1.5 cm. GYN consultation and direct visualization is advised to exclude underlying malignancy. Continued faint hyperdensity along the fundal portion of the gallbladder, likely represent adenomyomatosis. Consider nonemergent correlation with right upper quadrant ultrasound to exclude gallstone.  Patient received 3L NS bolus, morphine 2mg IVP x 1, Zofran 4 mg IVP x 1

## 2019-12-07 NOTE — CONSULT NOTE ADULT - PROBLEM SELECTOR RECOMMENDATION 2
- suspect 2/2 colitis  - monitor h/h daily, transfuse prn  - plan as above - suspect 2/2 colitis  - monitor h/h daily, transfuse prn  - hold ac/nsaids   - ppi daily for gi ppx   - plan as above

## 2019-12-07 NOTE — H&P ADULT - NSICDXPASTMEDICALHX_GEN_ALL_CORE_FT
PAST MEDICAL HISTORY:  Anxiety disorder     HTN (hypertension)     Hypothyroidism     IBS (irritable bowel syndrome)

## 2019-12-07 NOTE — H&P ADULT - NSICDXFAMILYHX_GEN_ALL_CORE_FT
FAMILY HISTORY:  Family history of acute renal failure  Family history of COPD (chronic obstructive pulmonary disease)    Sibling  Still living? Yes, Estimated age: 51-60  Family history of systemic lupus erythematosus, Age at diagnosis: 51-60

## 2019-12-07 NOTE — CHART NOTE - NSCHARTNOTEFT_GEN_A_CORE
Patient seen and examined at bedside. H&P reviewed. Will start clear liquid diet. Gentle hydration and IV antibiotics. GI consult. D/w RN

## 2019-12-07 NOTE — ED ADULT NURSE REASSESSMENT NOTE - NS ED NURSE REASSESS COMMENT FT1
Pt had 2 episodes of bright red blood in the stool in restroom. Complaining of abdominal cramps and nausea.  Seated on the bedside commode now. ER MD aware. Meds given.  at bedside. Vitals stable. To be admitted. Will ctm.

## 2019-12-08 LAB
HCV AB S/CO SERPL IA: 0.1 S/CO — SIGNIFICANT CHANGE UP (ref 0–0.99)
HCV AB SERPL-IMP: SIGNIFICANT CHANGE UP

## 2019-12-08 PROCEDURE — 99233 SBSQ HOSP IP/OBS HIGH 50: CPT

## 2019-12-08 RX ORDER — SODIUM CHLORIDE 9 MG/ML
1000 INJECTION INTRAMUSCULAR; INTRAVENOUS; SUBCUTANEOUS
Refills: 0 | Status: DISCONTINUED | OUTPATIENT
Start: 2019-12-08 | End: 2019-12-09

## 2019-12-08 RX ORDER — AMLODIPINE BESYLATE 2.5 MG/1
2.5 TABLET ORAL ONCE
Refills: 0 | Status: COMPLETED | OUTPATIENT
Start: 2019-12-08 | End: 2019-12-08

## 2019-12-08 RX ORDER — ACETAMINOPHEN 500 MG
650 TABLET ORAL EVERY 6 HOURS
Refills: 0 | Status: DISCONTINUED | OUTPATIENT
Start: 2019-12-08 | End: 2019-12-12

## 2019-12-08 RX ADMIN — Medication 10 MILLIGRAM(S): at 12:05

## 2019-12-08 RX ADMIN — ONDANSETRON 4 MILLIGRAM(S): 8 TABLET, FILM COATED ORAL at 19:16

## 2019-12-08 RX ADMIN — Medication 650 MILLIGRAM(S): at 13:00

## 2019-12-08 RX ADMIN — Medication 650 MILLIGRAM(S): at 21:31

## 2019-12-08 RX ADMIN — Medication 10 MILLIGRAM(S): at 17:11

## 2019-12-08 RX ADMIN — SODIUM CHLORIDE 75 MILLILITER(S): 9 INJECTION INTRAMUSCULAR; INTRAVENOUS; SUBCUTANEOUS at 12:06

## 2019-12-08 RX ADMIN — Medication 75 MICROGRAM(S): at 05:11

## 2019-12-08 RX ADMIN — PANTOPRAZOLE SODIUM 40 MILLIGRAM(S): 20 TABLET, DELAYED RELEASE ORAL at 05:11

## 2019-12-08 RX ADMIN — BUPROPION HYDROCHLORIDE 300 MILLIGRAM(S): 150 TABLET, EXTENDED RELEASE ORAL at 12:05

## 2019-12-08 RX ADMIN — CEFTRIAXONE 100 MILLIGRAM(S): 500 INJECTION, POWDER, FOR SOLUTION INTRAMUSCULAR; INTRAVENOUS at 05:11

## 2019-12-08 RX ADMIN — Medication 10 MILLIGRAM(S): at 08:27

## 2019-12-08 RX ADMIN — Medication 100 MILLIGRAM(S): at 21:11

## 2019-12-08 RX ADMIN — Medication 650 MILLIGRAM(S): at 08:33

## 2019-12-08 RX ADMIN — Medication 650 MILLIGRAM(S): at 21:03

## 2019-12-08 RX ADMIN — AMLODIPINE BESYLATE 2.5 MILLIGRAM(S): 2.5 TABLET ORAL at 21:00

## 2019-12-08 RX ADMIN — Medication 100 MILLIGRAM(S): at 05:11

## 2019-12-08 RX ADMIN — Medication 100 MILLIGRAM(S): at 14:22

## 2019-12-08 RX ADMIN — Medication 650 MILLIGRAM(S): at 14:15

## 2019-12-08 RX ADMIN — Medication 650 MILLIGRAM(S): at 09:25

## 2019-12-08 NOTE — CHART NOTE - NSCHARTNOTEFT_GEN_A_CORE
Called by RN for headache. Pt seen and examined at bedside.    Patient stating she's had a headache since waking up this morning. Has history of migraines. States the headache has progressively worsened over the course of the day. Located in the frontal region with radiation to the back of the neck. Currently 8/10, sinus pressure-like pain. Admits to associated photophobia and mild phonophobia. Denies changes in vision. Patient states when she has a headache like this at home, she will take a double dose of tylenol migraine, place a wet cloth on her forehead, and go to sleep.      Allergies    Cipro (Hives)  codeine (Vomiting)    Intolerances    tramadol (Vomiting)      Vitals  Vital Signs Last 24 Hrs  T(C): 36.8 (08 Dec 2019 16:22), Max: 37.1 (07 Dec 2019 23:51)  T(F): 98.2 (08 Dec 2019 16:22), Max: 98.8 (07 Dec 2019 23:51)  HR: 70 (08 Dec 2019 16:22) (67 - 71)  BP: 126/77 (08 Dec 2019 16:22) (98/64 - 126/77)  RR: 16 (08 Dec 2019 16:22) (16 - 18)  SpO2: 95% (08 Dec 2019 16:22) (93% - 96%)      General: Elderly female, NAD  Neurology: A&Ox3, nonfocal, CN II-XII grossly intact       LABS:                        12.8   x     )-----------( x        ( 07 Dec 2019 10:26 )             38.8     12-07    145  |  112<H>  |  13  ----------------------------<  124<H>  4.3   |  27  |  0.96    Ca    8.0<L>      07 Dec 2019 05:43    TPro  7.3  /  Alb  3.9  /  TBili  0.3  /  DBili  x   /  AST  21  /  ALT  26  /  AlkPhos  91  12-07    PT/INR - ( 06 Dec 2019 23:40 )   PT: 10.7 sec;   INR: 0.95 ratio         PTT - ( 06 Dec 2019 23:40 )  PTT:27.4 sec            A/P: 70 y/o F admitted for management of GI bleed suspect secondary to colitis. RN notified that patient having headache, additionally that blood pressure is elevated to 157/77.   - Patient noted to have missed AM dose of amlodipine due to hold parameters. Home dose 2.5mg amlodipine ordered STAT.  - Nurse to give PRN tylenol 650mg for pain as ordered. Patient unable to receive NSAIDs in setting of GI bleed.  - Cold pack for forehead  - Will continue to follow  - RN to notify with changes Called by RN for headache. Pt seen and examined at bedside.    Patient stating she's had a headache since waking up this morning. Has history of migraines. States the headache has progressively worsened over the course of the day. Located in the frontal region with radiation to the back of the neck. Currently 8/10, sinus pressure-like pain. Admits to associated photophobia and mild phonophobia. Denies changes in vision. Patient states when she has a headache like this at home, she will take a double dose of tylenol migraine, place a wet cloth on her forehead, and go to sleep.      Allergies    Cipro (Hives)  codeine (Vomiting)    Intolerances    tramadol (Vomiting)      Vitals  Vital Signs Last 24 Hrs  T(C): 36.8 (08 Dec 2019 16:22), Max: 37.1 (07 Dec 2019 23:51)  T(F): 98.2 (08 Dec 2019 16:22), Max: 98.8 (07 Dec 2019 23:51)  HR: 70 (08 Dec 2019 16:22) (67 - 71)  BP: 126/77 (08 Dec 2019 16:22) (98/64 - 126/77)  RR: 16 (08 Dec 2019 16:22) (16 - 18)  SpO2: 95% (08 Dec 2019 16:22) (93% - 96%)      General: Elderly female, NAD  Neurology: A&Ox3, nonfocal, CN II-XII grossly intact       LABS:                        12.8   x     )-----------( x        ( 07 Dec 2019 10:26 )             38.8     12-07    145  |  112<H>  |  13  ----------------------------<  124<H>  4.3   |  27  |  0.96    Ca    8.0<L>      07 Dec 2019 05:43    TPro  7.3  /  Alb  3.9  /  TBili  0.3  /  DBili  x   /  AST  21  /  ALT  26  /  AlkPhos  91  12-07    PT/INR - ( 06 Dec 2019 23:40 )   PT: 10.7 sec;   INR: 0.95 ratio         PTT - ( 06 Dec 2019 23:40 )  PTT:27.4 sec            A/P: 68 y/o F admitted for management of GI bleed suspect secondary to colitis. RN notified that patient having headache, additionally that blood pressure is elevated to 157/77.   - Patient noted to have missed AM dose of amlodipine due to hold parameters. Home dose 2.5mg amlodipine ordered STAT.  - Nurse to give PRN tylenol 650mg for pain as ordered. Patient unable to receive NSAIDs in setting of GI bleed.  - Cold pack for forehead  - Will continue to monitor  - RN to notify with changes    Addendum 03:29:  - Notified by RN that patient has persistent headache. Patient seen and examined at bedside. No focal neurological deficits present on exam.  - RN to give ordered PRN morphine for severe pain along with ordered PRN zofran.  - Will continue to monitor  - RN to notify with changes

## 2019-12-08 NOTE — PROGRESS NOTE ADULT - SUBJECTIVE AND OBJECTIVE BOX
INTERVAL HPI/OVERNIGHT EVENTS:    pt seen and examined  she admits to improvement in abdominal pain  denies n/v  tolerating clears  + scant brbpr this morning, overall less bleeding  no further loose stool  hgb remains stable     MEDICATIONS  (STANDING):  amLODIPine   Tablet 2.5 milliGRAM(s) Oral daily  buPROPion XL . 300 milliGRAM(s) Oral daily  cefTRIAXone   IVPB 2000 milliGRAM(s) IV Intermittent every 24 hours  dicyclomine 10 milliGRAM(s) Oral three times a day before meals  levothyroxine 75 MICROGram(s) Oral daily  metroNIDAZOLE  IVPB      metroNIDAZOLE  IVPB 500 milliGRAM(s) IV Intermittent every 8 hours  pantoprazole    Tablet 40 milliGRAM(s) Oral before breakfast  sodium chloride 0.9%. 1000 milliLiter(s) (75 mL/Hr) IV Continuous <Continuous>    MEDICATIONS  (PRN):  acetaminophen   Tablet .. 650 milliGRAM(s) Oral every 6 hours PRN Temp greater or equal to 38C (100.4F), Mild Pain (1 - 3)  morphine  - Injectable 1 milliGRAM(s) IV Push every 4 hours PRN Severe Pain (7 - 10)  ondansetron Injectable 4 milliGRAM(s) IV Push every 6 hours PRN Nausea and/or Vomiting      Allergies    Cipro (Hives)  codeine (Vomiting)    Intolerances    tramadol (Vomiting)      Review of Systems:    General:  No wt loss, fevers, chills, night sweats,fatigue,   Eyes:  Good vision, no reported pain  ENT:  No sore throat, pain, runny nose, dysphagia  CV:  No pain, palpitations, hypo/hypertension  Resp:  No dyspnea, cough, tachypnea, wheezing  GI:  + brbpr  :  No pain, bleeding, incontinence, nocturia  Muscle:  No pain, weakness  Neuro:  No weakness, tingling, memory problems  Psych:  No fatigue, insomnia, mood problems, depression  Endocrine:  No polyuria, polydypsia, cold/heat intolerance  Heme:  No petechiae, ecchymosis, easy bruisability  Skin:  No rash, tattoos, scars, edema      Vital Signs Last 24 Hrs  T(C): 36.8 (08 Dec 2019 07:32), Max: 37.1 (07 Dec 2019 23:51)  T(F): 98.2 (08 Dec 2019 07:32), Max: 98.8 (07 Dec 2019 23:51)  HR: 71 (08 Dec 2019 07:32) (67 - 78)  BP: 119/70 (08 Dec 2019 07:32) (98/64 - 119/70)  BP(mean): --  RR: 16 (08 Dec 2019 07:32) (16 - 18)  SpO2: 95% (08 Dec 2019 07:32) (93% - 97%)    PHYSICAL EXAM:    Constitutional: NAD, well-developed  HEENT: EOMI, throat clear  Neck: No LAD, supple  Respiratory: CTA and P  Cardiovascular: S1 and S2, RRR, no M  Gastrointestinal: BS+, soft, NT/ND, neg HSM,  Extremities: No peripheral edema, neg clubing, cyanosis  Vascular: 2+ peripheral pulses  Neurological: A/O x 3, no focal deficits  Psychiatric: Normal mood, normal affect  Skin: No rashes      LABS:                        12.8   x     )-----------( x        ( 07 Dec 2019 10:26 )             38.8     12-07    145  |  112<H>  |  13  ----------------------------<  124<H>  4.3   |  27  |  0.96    Ca    8.0<L>      07 Dec 2019 05:43    TPro  7.3  /  Alb  3.9  /  TBili  0.3  /  DBili  x   /  AST  21  /  ALT  26  /  AlkPhos  91  12-07    PT/INR - ( 06 Dec 2019 23:40 )   PT: 10.7 sec;   INR: 0.95 ratio         PTT - ( 06 Dec 2019 23:40 )  PTT:27.4 sec      RADIOLOGY & ADDITIONAL TESTS:

## 2019-12-08 NOTE — PROGRESS NOTE ADULT - ASSESSMENT
Patient is a 70 y/o female with PMH of IBS, frequent vasovagal syncope after bowel movements, HTN, hypothyroidism. generalized anxiety disorder who presents c/o bright red blood per rectum since last night, admitted for management of GI bleed suspect secondary to colitis.

## 2019-12-08 NOTE — PROGRESS NOTE ADULT - PROBLEM SELECTOR PLAN 2
Monitor   Fall precautions  Encourage oral hydration  Continue IV fluids for now, since BP is borderline

## 2019-12-08 NOTE — PROGRESS NOTE ADULT - PROBLEM SELECTOR PLAN 2
- suspect 2/2 colitis  - monitor h/h daily, transfuse prn  - hold ac/nsaids   - ppi daily for gi ppx   - plan as above

## 2019-12-08 NOTE — PROGRESS NOTE ADULT - ASSESSMENT
70 y/o female with PMH of IBS, frequent vasovagal syncope after bowel movements, HTN, hypothyroidism. generalized anxiety disorder who presents c/o bright red blood per rectum since last night, CT imaging with concern for uncomplicated colitis. GI consulted.

## 2019-12-08 NOTE — PROGRESS NOTE ADULT - SUBJECTIVE AND OBJECTIVE BOX
Patient is a 69y old  Female who presents with a chief complaint of GI bleed (07 Dec 2019 12:50)      INTERVAL HPI/OVERNIGHT EVENTS: 70 y/o female with PMH of IBS, frequent vasovagal syncope after bowel movements, HTN, hypothyroidism. generalized anxiety disorder who presents c/o bright red blood per rectum since last night, admitted for management of GI bleed suspect secondary to colitis. Seen and examined at bedside. Feeling better, still has abdominal cramp but has h/o IBS and has chronic cramps  and is on bentyl TID. Denies chest pain, palpitation, sob.     MEDICATIONS  (STANDING):  amLODIPine   Tablet 2.5 milliGRAM(s) Oral daily  buPROPion XL . 300 milliGRAM(s) Oral daily  cefTRIAXone   IVPB 2000 milliGRAM(s) IV Intermittent every 24 hours  dextrose 5% + sodium chloride 0.9%. 1000 milliLiter(s) (75 mL/Hr) IV Continuous <Continuous>  dicyclomine 10 milliGRAM(s) Oral three times a day before meals  levothyroxine 75 MICROGram(s) Oral daily  metroNIDAZOLE  IVPB      metroNIDAZOLE  IVPB 500 milliGRAM(s) IV Intermittent every 8 hours  pantoprazole    Tablet 40 milliGRAM(s) Oral before breakfast    MEDICATIONS  (PRN):  acetaminophen   Tablet .. 650 milliGRAM(s) Oral every 6 hours PRN Temp greater or equal to 38C (100.4F), Mild Pain (1 - 3)  morphine  - Injectable 1 milliGRAM(s) IV Push every 4 hours PRN Severe Pain (7 - 10)  ondansetron Injectable 4 milliGRAM(s) IV Push every 6 hours PRN Nausea and/or Vomiting      Allergies    Cipro (Hives)  codeine (Vomiting)    Intolerances    tramadol (Vomiting)      REVIEW OF SYSTEMS:  CONSTITUTIONAL: No fever, weight loss, or fatigue  EYES: No eye pain, visual disturbances, or discharge  ENMT:  No difficulty hearing, tinnitus, vertigo; No sinus or throat pain  NECK: No pain or stiffness  RESPIRATORY: No cough, wheezing, chills or hemoptysis; No shortness of breath  CARDIOVASCULAR: No chest pain, palpitations, dizziness, or leg swelling  GASTROINTESTINAL: + abdominal cramps,  No nausea, vomiting, or hematemesis; No diarrhea or constipation. No melena or hematochezia.  GENITOURINARY: No dysuria, frequency, hematuria, or incontinence  NEUROLOGICAL: No headaches, loss of strength, numbness, or tremors  SKIN: No itching, burning, rashes, or lesions   LYMPH NODES: No enlarged glands  ENDOCRINE: No heat or cold intolerance; No hair loss; No polydipsia or polyuria  MUSCULOSKELETAL: No joint pain or swelling; No muscle, back, or extremity pain  PSYCHIATRIC: No depression, anxiety, mood swings, or difficulty sleeping  HEME/LYMPH: No easy bruising, or bleeding gums  ALLERGY AND IMMUNOLOGIC: No hives or eczema    Vital Signs Last 24 Hrs  T(C): 36.8 (08 Dec 2019 07:32), Max: 37.1 (07 Dec 2019 23:51)  T(F): 98.2 (08 Dec 2019 07:32), Max: 98.8 (07 Dec 2019 23:51)  HR: 71 (08 Dec 2019 07:32) (67 - 78)  BP: 119/70 (08 Dec 2019 07:32) (98/64 - 119/70)  BP(mean): --  RR: 16 (08 Dec 2019 07:32) (16 - 18)  SpO2: 95% (08 Dec 2019 07:32) (93% - 97%)    PHYSICAL EXAM:  GENERAL: NAD, well-groomed, well-developed  HEAD:  Atraumatic, Normocephalic  EYES: EOMI, PERRLA, conjunctiva and sclera clear  ENMT: No tonsillar erythema, exudates, or enlargement; Moist mucous membranes, Good dentition, No lesions  NECK: Supple, No JVD, Normal thyroid  NERVOUS SYSTEM:  Alert & Oriented X3, Good concentration; Motor Strength 5/5 B/L upper and lower extremities  CHEST/LUNG: Clear to auscultation bilaterally; No rales, rhonchi, wheezing, or rubs  HEART: Regular rate and rhythm; No murmurs, rubs, or gallops  ABDOMEN: Soft, Nontender, Nondistended; Bowel sounds present  EXTREMITIES:  2+ Peripheral Pulses, No clubbing, cyanosis, or edema  LYMPH: No lymphadenopathy noted  SKIN: No rashes or lesions    LABS:                        12.8   x     )-----------( x        ( 07 Dec 2019 10:26 )             38.8       Ca    8.0        07 Dec 2019 05:43      PT/INR - ( 06 Dec 2019 23:40 )   PT: 10.7 sec;   INR: 0.95 ratio         PTT - ( 06 Dec 2019 23:40 )  PTT:27.4 sec  CAPILLARY BLOOD GLUCOSE        BLOOD CULTURE    RADIOLOGY & ADDITIONAL TESTS:    Imaging Personally Reviewed:  [ ] YES     Consultant(s) Notes Reviewed:      Care Discussed with Consultants/Other Providers:

## 2019-12-08 NOTE — PROGRESS NOTE ADULT - PROBLEM SELECTOR PLAN 1
- Suspect secondary to colitis. No new events of GIB   - CT with findings of uncomplicated colitis  -last EGD/colonoscopy 2 years ago wnl  - On clears, will advance as tolerated   - transfuse PRN for significant bleeding or hemodynamic instability.  -Continue bentyl, takes TID around the clock at home not PRN.   -Zofran PRN for nausea   -GI (Sidiridis) following

## 2019-12-08 NOTE — PROGRESS NOTE ADULT - PROBLEM SELECTOR PLAN 1
- ischemic vs infectious etiology   - agree with IV abx (cipro/flagyl)  - check stool studies (gi pcr)  - pt will need a colonoscopy in 6-8 weeks once resolution of infection/inflammation to r/o underlying malignancy  - on clear liquid diet, recommend advancing to full liquid diet

## 2019-12-08 NOTE — PROGRESS NOTE ADULT - PROBLEM SELECTOR PLAN 3
-Patient with evidence of uncomplicated colitis on CT  - Doubt ischemic, not in severe pain, lactate normal  - Continue IV antibiotics for now until oral intake better

## 2019-12-09 DIAGNOSIS — R51 HEADACHE: ICD-10-CM

## 2019-12-09 LAB
ANION GAP SERPL CALC-SCNC: 7 MMOL/L — SIGNIFICANT CHANGE UP (ref 5–17)
BUN SERPL-MCNC: 6 MG/DL — LOW (ref 7–23)
CALCIUM SERPL-MCNC: 8.5 MG/DL — SIGNIFICANT CHANGE UP (ref 8.5–10.1)
CHLORIDE SERPL-SCNC: 107 MMOL/L — SIGNIFICANT CHANGE UP (ref 96–108)
CO2 SERPL-SCNC: 30 MMOL/L — SIGNIFICANT CHANGE UP (ref 22–31)
CREAT SERPL-MCNC: 0.92 MG/DL — SIGNIFICANT CHANGE UP (ref 0.5–1.3)
GLUCOSE SERPL-MCNC: 96 MG/DL — SIGNIFICANT CHANGE UP (ref 70–99)
HCT VFR BLD CALC: 38.6 % — SIGNIFICANT CHANGE UP (ref 34.5–45)
HGB BLD-MCNC: 12.6 G/DL — SIGNIFICANT CHANGE UP (ref 11.5–15.5)
MCHC RBC-ENTMCNC: 31.1 PG — SIGNIFICANT CHANGE UP (ref 27–34)
MCHC RBC-ENTMCNC: 32.6 GM/DL — SIGNIFICANT CHANGE UP (ref 32–36)
MCV RBC AUTO: 95.3 FL — SIGNIFICANT CHANGE UP (ref 80–100)
NRBC # BLD: 0 /100 WBCS — SIGNIFICANT CHANGE UP (ref 0–0)
PLATELET # BLD AUTO: 252 K/UL — SIGNIFICANT CHANGE UP (ref 150–400)
POTASSIUM SERPL-MCNC: 3.2 MMOL/L — LOW (ref 3.5–5.3)
POTASSIUM SERPL-SCNC: 3.2 MMOL/L — LOW (ref 3.5–5.3)
RBC # BLD: 4.05 M/UL — SIGNIFICANT CHANGE UP (ref 3.8–5.2)
RBC # FLD: 13.2 % — SIGNIFICANT CHANGE UP (ref 10.3–14.5)
SODIUM SERPL-SCNC: 144 MMOL/L — SIGNIFICANT CHANGE UP (ref 135–145)
WBC # BLD: 4.92 K/UL — SIGNIFICANT CHANGE UP (ref 3.8–10.5)
WBC # FLD AUTO: 4.92 K/UL — SIGNIFICANT CHANGE UP (ref 3.8–10.5)

## 2019-12-09 PROCEDURE — 99233 SBSQ HOSP IP/OBS HIGH 50: CPT

## 2019-12-09 PROCEDURE — 70450 CT HEAD/BRAIN W/O DYE: CPT | Mod: 26

## 2019-12-09 RX ORDER — LACTOBACILLUS ACIDOPHILUS 100MM CELL
1 CAPSULE ORAL THREE TIMES A DAY
Refills: 0 | Status: DISCONTINUED | OUTPATIENT
Start: 2019-12-09 | End: 2019-12-12

## 2019-12-09 RX ORDER — SIMETHICONE 80 MG/1
80 TABLET, CHEWABLE ORAL THREE TIMES A DAY
Refills: 0 | Status: DISCONTINUED | OUTPATIENT
Start: 2019-12-09 | End: 2019-12-12

## 2019-12-09 RX ORDER — POTASSIUM CHLORIDE 20 MEQ
40 PACKET (EA) ORAL ONCE
Refills: 0 | Status: COMPLETED | OUTPATIENT
Start: 2019-12-09 | End: 2019-12-09

## 2019-12-09 RX ADMIN — Medication 10 MILLIGRAM(S): at 13:17

## 2019-12-09 RX ADMIN — Medication 10 MILLIGRAM(S): at 08:50

## 2019-12-09 RX ADMIN — Medication 40 MILLIEQUIVALENT(S): at 13:17

## 2019-12-09 RX ADMIN — SIMETHICONE 80 MILLIGRAM(S): 80 TABLET, CHEWABLE ORAL at 13:17

## 2019-12-09 RX ADMIN — BUPROPION HYDROCHLORIDE 300 MILLIGRAM(S): 150 TABLET, EXTENDED RELEASE ORAL at 13:16

## 2019-12-09 RX ADMIN — Medication 100 MILLIGRAM(S): at 05:56

## 2019-12-09 RX ADMIN — Medication 100 MILLIGRAM(S): at 21:09

## 2019-12-09 RX ADMIN — PANTOPRAZOLE SODIUM 40 MILLIGRAM(S): 20 TABLET, DELAYED RELEASE ORAL at 05:23

## 2019-12-09 RX ADMIN — Medication 1 TABLET(S): at 13:16

## 2019-12-09 RX ADMIN — Medication 75 MICROGRAM(S): at 05:23

## 2019-12-09 RX ADMIN — SIMETHICONE 80 MILLIGRAM(S): 80 TABLET, CHEWABLE ORAL at 21:09

## 2019-12-09 RX ADMIN — ONDANSETRON 4 MILLIGRAM(S): 8 TABLET, FILM COATED ORAL at 15:17

## 2019-12-09 RX ADMIN — MORPHINE SULFATE 1 MILLIGRAM(S): 50 CAPSULE, EXTENDED RELEASE ORAL at 04:10

## 2019-12-09 RX ADMIN — ONDANSETRON 4 MILLIGRAM(S): 8 TABLET, FILM COATED ORAL at 03:34

## 2019-12-09 RX ADMIN — Medication 1 CAPSULE(S): at 10:30

## 2019-12-09 RX ADMIN — CEFTRIAXONE 100 MILLIGRAM(S): 500 INJECTION, POWDER, FOR SOLUTION INTRAMUSCULAR; INTRAVENOUS at 05:23

## 2019-12-09 RX ADMIN — AMLODIPINE BESYLATE 2.5 MILLIGRAM(S): 2.5 TABLET ORAL at 05:23

## 2019-12-09 RX ADMIN — Medication 1 TABLET(S): at 21:09

## 2019-12-09 RX ADMIN — Medication 100 MILLIGRAM(S): at 15:16

## 2019-12-09 RX ADMIN — Medication 1 CAPSULE(S): at 10:37

## 2019-12-09 RX ADMIN — MORPHINE SULFATE 1 MILLIGRAM(S): 50 CAPSULE, EXTENDED RELEASE ORAL at 03:34

## 2019-12-09 RX ADMIN — ONDANSETRON 4 MILLIGRAM(S): 8 TABLET, FILM COATED ORAL at 09:26

## 2019-12-09 NOTE — PROGRESS NOTE ADULT - SUBJECTIVE AND OBJECTIVE BOX
INTERVAL HPI/OVERNIGHT EVENTS:  pt seen and examined  c/o blankenship  denies n/v  states abd pain better  no bm no rectal bleeding, c/o increased gas  tolerating clears    MEDICATIONS  (STANDING):  amLODIPine   Tablet 2.5 milliGRAM(s) Oral daily  buPROPion XL . 300 milliGRAM(s) Oral daily  cefTRIAXone   IVPB 2000 milliGRAM(s) IV Intermittent every 24 hours  dicyclomine 10 milliGRAM(s) Oral three times a day before meals  levothyroxine 75 MICROGram(s) Oral daily  metroNIDAZOLE  IVPB      metroNIDAZOLE  IVPB 500 milliGRAM(s) IV Intermittent every 8 hours  pantoprazole    Tablet 40 milliGRAM(s) Oral before breakfast  potassium chloride    Tablet ER 40 milliEquivalent(s) Oral once    MEDICATIONS  (PRN):  acetaminophen   Tablet .. 650 milliGRAM(s) Oral every 6 hours PRN Temp greater or equal to 38C (100.4F), Mild Pain (1 - 3)  acetaminophen 300 mG/butalbital 50 mG/ caffeine 40 mG 1 Capsule(s) Oral every 8 hours PRN migraine  morphine  - Injectable 1 milliGRAM(s) IV Push every 4 hours PRN Severe Pain (7 - 10)  ondansetron Injectable 4 milliGRAM(s) IV Push every 6 hours PRN Nausea and/or Vomiting      Allergies    Cipro (Hives)  codeine (Vomiting)    Intolerances    tramadol (Vomiting)      Review of Systems:    General:  No wt loss, fevers, chills, night sweats, fatigue   Eyes:  Good vision, no reported pain  ENT:  No sore throat, pain, runny nose, dysphagia  CV:  No pain, palpitations, hypo/hypertension  Resp:  No dyspnea, cough, tachypnea, wheezing  GI:  +gas improved pain, No nausea, No vomiting, No diarrhea, No constipation, No weight loss, No fever, No pruritis, No rectal bleeding, No melena, No dysphagia  :  No pain, bleeding, incontinence, nocturia  Muscle:  No pain, weakness  Neuro: ha  Psych:  No fatigue, insomnia, mood problems, depression  Endocrine:  No polyuria, polydypsia, cold/heat intolerance  Heme:  No petechiae, ecchymosis, easy bruisability  Skin:  No rash, tattoos, scars, edema      Vital Signs Last 24 Hrs  T(C): 36.8 (09 Dec 2019 07:27), Max: 37 (08 Dec 2019 11:20)  T(F): 98.2 (09 Dec 2019 07:27), Max: 98.6 (08 Dec 2019 11:20)  HR: 67 (09 Dec 2019 07:27) (66 - 84)  BP: 131/74 (09 Dec 2019 07:27) (116/67 - 157/77)  BP(mean): --  RR: 17 (09 Dec 2019 07:27) (16 - 18)  SpO2: 96% (09 Dec 2019 07:27) (93% - 97%)    PHYSICAL EXAM:    Constitutional: lying in bed  HEENT: ncat  Neck: No LAD  Gastrointestinal: soft mild ttp llq nd  Extremities: No peripheral edema  Neurological: A/O x 3      LABS:                        12.6   4.92  )-----------( 252      ( 09 Dec 2019 06:38 )             38.6     12-09    144  |  107  |  6<L>  ----------------------------<  96  3.2<L>   |  30  |  0.92    Ca    8.5      09 Dec 2019 06:38            RADIOLOGY & ADDITIONAL TESTS:

## 2019-12-09 NOTE — PROGRESS NOTE ADULT - PROBLEM SELECTOR PLAN 1
Patient with severe headache starting yesterday morning. Received morphine overnight, headache still present this morning. Patient has hx of migraines which she typically treats with Tylenol.   - Started Fioricet Q8H PRN  - F/U CT head  - Neurology consulted, F/U recs.

## 2019-12-09 NOTE — PROGRESS NOTE ADULT - PROBLEM SELECTOR PLAN 1
gi pcr not sent as no further bm  cont abx  cont bacid; bentyl  start simethicone tid  prn pain control  monitor exam/gi fxn  advanced to fulls; if continues to improve advance to low fiber in am   will need op colonoscopy in 6-8 weeks once improved

## 2019-12-09 NOTE — PROGRESS NOTE ADULT - PROBLEM SELECTOR PLAN 2
suspect 2/2 colitis; now resolving  monitor h/h daily, transfuse prn  ppi daily for gi ppx   op colon once improved

## 2019-12-09 NOTE — PROGRESS NOTE ADULT - PROBLEM SELECTOR PLAN 2
- Suspect secondary to colitis.   - CT with findings of uncomplicated colitis  - last EGD/colonoscopy 2 years ago wnl  - Full liquid, will advance as tolerated   - transfuse PRN for significant bleeding or hemodynamic instability.  - Continue bentyl, takes TID around the clock at home not PRN.   - Zofran PRN for nausea   - GI (Sidiridis) following  - H&H stable

## 2019-12-09 NOTE — PROGRESS NOTE ADULT - PROBLEM SELECTOR PROBLEM 2
Learning About Diabetes Food Guidelines Your Care Instructions Meal planning is important to manage diabetes. It helps keep your blood sugar at a target level (which you set with your doctor). You don't have to eat special foods. You can eat what your family eats, including sweets once in a while. But you do have to pay attention to how often you eat and how much you eat of certain foods. You may want to work with a dietitian or a certified diabetes educator (CDE) to help you plan meals and snacks. A dietitian or CDE can also help you lose weight if that is one of your goals. What should you know about eating carbs? Managing the amount of carbohydrate (carbs) you eat is an important part of healthy meals when you have diabetes. Carbohydrate is found in many foods. · Learn which foods have carbs. And learn the amounts of carbs in different foods. ? Bread, cereal, pasta, and rice have about 15 grams of carbs in a serving. A serving is 1 slice of bread (1 ounce), ½ cup of cooked cereal, or 1/3 cup of cooked pasta or rice. ? Fruits have 15 grams of carbs in a serving. A serving is 1 small fresh fruit, such as an apple or orange; ½ of a banana; ½ cup of cooked or canned fruit; ½ cup of fruit juice; 1 cup of melon or raspberries; or 2 tablespoons of dried fruit. ? Milk and no-sugar-added yogurt have 15 grams of carbs in a serving. A serving is 1 cup of milk or 2/3 cup of no-sugar-added yogurt. ? Starchy vegetables have 15 grams of carbs in a serving. A serving is ½ cup of mashed potatoes or sweet potato; 1 cup winter squash; ½ of a small baked potato; ½ cup of cooked beans; or ½ cup cooked corn or green peas. · Learn how much carbs to eat each day and at each meal. A dietitian or CDE can teach you how to keep track of the amount of carbs you eat. This is called carbohydrate counting.  
· If you are not sure how to count carbohydrate grams, use the Plate Method to plan meals. It is a good, quick way to make sure that you have a balanced meal. It also helps you spread carbs throughout the day. ? Divide your plate by types of foods. Put non-starchy vegetables on half the plate, meat or other protein food on one-quarter of the plate, and a grain or starchy vegetable in the final quarter of the plate. To this you can add a small piece of fruit and 1 cup of milk or yogurt, depending on how many carbs you are supposed to eat at a meal. 
· Try to eat about the same amount of carbs at each meal. Do not \"save up\" your daily allowance of carbs to eat at one meal. 
· Proteins have very little or no carbs per serving. Examples of proteins are beef, chicken, turkey, fish, eggs, tofu, cheese, cottage cheese, and peanut butter. A serving size of meat is 3 ounces, which is about the size of a deck of cards. Examples of meat substitute serving sizes (equal to 1 ounce of meat) are 1/4 cup of cottage cheese, 1 egg, 1 tablespoon of peanut butter, and ½ cup of tofu. How can you eat out and still eat healthy? · Learn to estimate the serving sizes of foods that have carbohydrate. If you measure food at home, it will be easier to estimate the amount in a serving of restaurant food. · If the meal you order has too much carbohydrate (such as potatoes, corn, or baked beans), ask to have a low-carbohydrate food instead. Ask for a salad or green vegetables. · If you use insulin, check your blood sugar before and after eating out to help you plan how much to eat in the future. · If you eat more carbohydrate at a meal than you had planned, take a walk or do other exercise. This will help lower your blood sugar. What else should you know? · Limit saturated fat, such as the fat from meat and dairy products. This is a healthy choice because people who have diabetes are at higher risk of heart disease.  So choose lean cuts of meat and nonfat or low-fat dairy products. Use olive or canola oil instead of butter or shortening when cooking. · Don't skip meals. Your blood sugar may drop too low if you skip meals and take insulin or certain medicines for diabetes. · Check with your doctor before you drink alcohol. Alcohol can cause your blood sugar to drop too low. Alcohol can also cause a bad reaction if you take certain diabetes medicines. Follow-up care is a key part of your treatment and safety. Be sure to make and go to all appointments, and call your doctor if you are having problems. It's also a good idea to know your test results and keep a list of the medicines you take. Where can you learn more? Go to http://delfinoTellmecyndi.info/. Enter Y531 in the search box to learn more about \"Learning About Diabetes Food Guidelines. \" Current as of: July 25, 2018 Content Version: 11.9 © 7463-5406 [employer]. Care instructions adapted under license by [facility] [facility] (which disclaims liability or warranty for this information). If you have questions about a medical condition or this instruction, always ask your healthcare professional. [address] any warranty or liability for your use of this information. Counting Carbohydrates: Care Instructions Your Care Instructions You don't have to eat special foods when you have diabetes. You just have to be careful to eat healthy foods. Carbohydrates (carbs) raise blood sugar higher and quicker than any other nutrient. Carbs are found in desserts, breads and cereals, and fruit. They're also in starchy vegetables. These include potatoes, corn, and grains such as rice and pasta. Carbs are also in milk and yogurt. The more carbs you eat at one time, the higher your blood sugar will rise. Spreading carbs all through the day helps keep your blood sugar levels within your target range. Counting carbs is one of the best ways to keep your blood sugar under control. If you use insulin, counting carbs helps you match the right amount of insulin to the number of grams of carbs in a meal. Then you can change your diet and insulin dose as needed. Testing your blood sugar several times a day can help you learn how carbs affect your blood sugar. A registered dietitian or certified diabetes educator can help you plan meals and snacks. Follow-up care is a key part of your treatment and safety. Be sure to make and go to all appointments, and call your doctor if you are having problems. It's also a good idea to know your test results and keep a list of the medicines you take. How can you care for yourself at home? Know your daily amount of carbohydrates Your daily amount depends on several things, such as your weight, how active you are, which diabetes medicines you take, and what your goals are for your blood sugar levels. A registered dietitian or certified diabetes educator can help you plan how many carbs to include in each meal and snack. For most adults, a guideline for the daily amount of carbs is: · 45 to 60 grams at each meal. That's about the same as 3 to 4 carbohydrate servings. · 15 to 20 grams at each snack. That's about the same as 1 carbohydrate serving. Count carbs Counting carbs lets you know how much rapid-acting insulin to take before you eat. If you use an insulin pump, you get a constant rate of insulin during the day. So the pump must be programmed at meals. This gives you extra insulin to cover the rise in blood sugar after meals. If you take insulin: 
· Learn your own insulin-to-carb ratio. You and your diabetes health professional will figure out the ratio. You can do this by testing your blood sugar after meals. For example, you may need a certain amount of insulin for every 15 grams of carbs. · Add up the carb grams in a meal. Then you can figure out how many units of insulin to take based on your insulin-to-carb ratio. · Exercise lowers blood sugar. You can use less insulin than you would if you were not doing exercise. Keep in mind that timing matters. If you exercise within 1 hour after a meal, your body may need less insulin for that meal than it would if you exercised 3 hours after the meal. Test your blood sugar to find out how exercise affects your need for insulin. If you do or don't take insulin: 
· Look at labels on packaged foods. This can tell you how many carbs are in a serving. You can also use guides from the American Diabetes Association. · Be aware of portions, or serving sizes. If a package has two servings and you eat the whole package, you need to double the number of grams of carbohydrate listed for one serving. · Protein, fat, and fiber do not raise blood sugar as much as carbs do. If you eat a lot of these nutrients in a meal, your blood sugar will rise more slowly than it would otherwise. Eat from all food groups · Eat at least three meals a day. · Plan meals to include food from all the food groups. The food groups include grains, fruits, dairy, proteins, and vegetables. · Talk to your dietitian or diabetes educator about ways to add limited amounts of sweets into your meal plan. · If you drink alcohol, talk to your doctor. It may not be recommended when you are taking certain diabetes medicines. Where can you learn more? Go to http://delfino-cyndi.info/. Enter C235 in the search box to learn more about \"Counting Carbohydrates: Care Instructions. \" Current as of: July 25, 2018 Content Version: 11.9 © 0069-6681 [employer]. Care instructions adapted under license by [employer] (which disclaims liability or warranty for this information). If you have questions about a medical condition or this instruction, always ask your healthcare professional. [address] any warranty or liability for your use of this information. Learning About Meal Planning for Diabetes Why plan your meals? Meal planning can be a key part of managing diabetes. Planning meals and snacks with the right balance of carbohydrate, protein, and fat can help you keep your blood sugar at the target level you set with your doctor. You don't have to eat special foods. You can eat what your family eats, including sweets once in a while. But you do have to pay attention to how often you eat and how much you eat of certain foods. You may want to work with a dietitian or a certified diabetes educator. He or she can give you tips and meal ideas and can answer your questions about meal planning. This health professional can also help you reach a healthy weight if that is one of your goals. What plan is right for you? Your dietitian or diabetes educator may suggest that you start with the plate format or carbohydrate counting. The plate format The plate format is a simple way to help you manage how you eat. You plan meals by learning how much space each food should take on a plate. Using the plate format helps you spread carbohydrate throughout the day. It can make it easier to keep your blood sugar level within your target range. It also helps you see if you're eating healthy portion sizes. To use the plate format, you put non-starchy vegetables on half your plate. Add meat or meat substitutes on one-quarter of the plate. Put a grain or starchy vegetable (such as brown rice or a potato) on the final quarter of the plate. You can add a small piece of fruit and some low-fat or fat-free milk or yogurt, depending on your carbohydrate goal for each meal. 
Here are some tips for using the plate format: · Make sure that you are not using an oversized plate. A 9-inch plate is best. Many restaurants use larger plates. · Get used to using the plate format at home. Then you can use it when you eat out. · Write down your questions about using the plate format.  Talk to your GIB (gastrointestinal bleeding) doctor, a dietitian, or a diabetes educator about your concerns. Carbohydrate counting With carbohydrate counting, you plan meals based on the amount of carbohydrate in each food. Carbohydrate raises blood sugar higher and more quickly than any other nutrient. It is found in desserts, breads and cereals, and fruit. It's also found in starchy vegetables such as potatoes and corn, grains such as rice and pasta, and milk and yogurt. Spreading carbohydrate throughout the day helps keep your blood sugar levels within your target range. Your daily amount depends on several things, including your weight, how active you are, which diabetes medicines you take, and what your goals are for your blood sugar levels. A registered dietitian or diabetes educator can help you plan how much carbohydrate to include in each meal and snack. A guideline for your daily amount of carbohydrate is: · 45 to 60 grams at each meal. That's about the same as 3 to 4 carbohydrate servings. · 15 to 20 grams at each snack. That's about the same as 1 carbohydrate serving. The Nutrition Facts label on packaged foods tells you how much carbohydrate is in a serving of the food. First, look at the serving size on the food label. Is that the amount you eat in a serving? All of the nutrition information on a food label is based on that serving size. So if you eat more or less than that, you'll need to adjust the other numbers. Total carbohydrate is the next thing you need to look for on the label. If you count carbohydrate servings, one serving of carbohydrate is 15 grams. For foods that don't come with labels, such as fresh fruits and vegetables, you'll need a guide that lists carbohydrate in these foods. Ask your doctor, dietitian, or diabetes educator about books or other nutrition guides you can use.  
If you take insulin, you need to know how many grams of carbohydrate are in a meal. This lets you know how much rapid-acting insulin to take before you eat. If you use an insulin pump, you get a constant rate of insulin during the day. So the pump must be programmed at meals to give you extra insulin to cover the rise in blood sugar after meals. When you know how much carbohydrate you will eat, you can take the right amount of insulin. Or, if you always use the same amount of insulin, you need to make sure that you eat the same amount of carbohydrate at meals. If you need more help to understand carbohydrate counting and food labels, ask your doctor, dietitian, or diabetes educator. How do you get started with meal planning? Here are some tips to get started: 
· Plan your meals a week at a time. Don't forget to include snacks too. · Use cookbooks or online recipes to plan several main meals. Plan some quick meals for busy nights. You also can double some recipes that freeze well. Then you can save half for other busy nights when you don't have time to cook. · Make sure you have the ingredients you need for your recipes. If you're running low on basic items, put these items on your shopping list too. · List foods that you use to make breakfasts, lunches, and snacks. List plenty of fruits and vegetables. · Post this list on the refrigerator. Add to it as you think of more things you need. · Take the list to the store to do your weekly shopping. Follow-up care is a key part of your treatment and safety. Be sure to make and go to all appointments, and call your doctor if you are having problems. It's also a good idea to know your test results and keep a list of the medicines you take. Where can you learn more? Go to http://delfino-cyndi.info/. Paco Angry in the search box to learn more about \"Learning About Meal Planning for Diabetes. \" Current as of: July 25, 2018 Content Version: 11.9 © 7476-6918 [employer].  Care instructions adapted under license by [address] (which disclaims liability or warranty for this information). If you have questions about a medical condition or this instruction, always ask your healthcare professional. [address] any warranty or liability for your use of this information. Diabetes Blood Sugar Emergencies: Your Action Plan How can you prevent a blood sugar emergency? An important part of living with diabetes is keeping your blood sugar in your target range. You'll need to know what to do if it's too high or too low. Managing your blood sugar levels helps you avoid emergencies. This care sheet will teach you about the signs of high and low blood sugar. It will help you make an action plan with your doctor for when these signs occur. Low blood sugar is more likely to happen if you take certain medicines for diabetes. It can also happen if you skip a meal, drink alcohol, or exercise more than usual. 
You may get high blood sugar if you eat differently than you normally do. One example is eating more carbohydrate than usual. Having a cold, the flu, or other sudden illness can also cause high blood sugar levels. Levels can also rise if you miss a dose of medicine. Any change in how you take your medicine may affect your blood sugar level. So it's important to work with your doctor before you make any changes. Check your blood sugar Work with your doctor to fill in the blank spaces below that apply to you. Track your levels, know your target range, and write down ways you can get your blood sugar back in your target range. A log book can help you track your levels. Take the book to all of your medical appointments. · Check your blood sugar _____ times a day, at these times:________________________________________________. (For example: Before meals, at bedtime, before exercise, during exercise, other.) · Your blood sugar target range before a meal is ___________________.  Your blood sugar target range after a meal is _______________________. · Do this___________________________________________________to get your blood sugar back within your safe range if your blood sugar results are _________________________________________. (For example: Less than 70 or above 250 mg/dL.) Call your doctor when your blood sugar results are ___________________________________. (For example: Less than 70 or above 250 mg/dL.) What are the symptoms of low and high blood sugar? Common symptoms of low blood sugar are sweating and feeling shaky, weak, hungry, or confused. Symptoms can start quickly. Common symptoms of high blood sugar are feeling very thirsty or very hungry. You may also pass urine more often than usual. You may have blurry vision and may lose weight without trying. But some people may have high or low blood sugar without having any symptoms. That's a good reason to check your blood sugar on a regular schedule. What should you do if you have symptoms? Work with your doctor to fill in the blank spaces below that apply to you. Low blood sugar If you have symptoms of low blood sugar, check your blood sugar. If it's below _____ ( for example, below 70), eat or drink a quick-sugar food that has about 15 grams of carbohydrate. Your goal is to get your level back to your safe range. Check your blood sugar again 15 minutes later. If it's still not in your target range, take another 15 grams of carbohydrate and check your blood sugar again in 15 minutes. Repeat this until you reach your target. Then go back to your regular testing schedule. When you have low blood sugar, it's best to stop or reduce any physical activity until your blood sugar is back in your target range and is stable. If you must stay active, eat or drink 30 grams of carbohydrate. Then check your blood sugar again in 15 minutes. If it's not in your target range, take another 30 grams of carbohydrates.  Check your blood sugar again in 15 minutes. Keep doing this until you reach your target. You can then go back to your regular testing schedule. If your symptoms or blood sugar levels are getting worse or have not improved after 15 minutes, seek medical care right away. Here are some examples of quick-sugar foods with 15 grams of carbohydrate: · 3 or 4 glucose tablets · 1 tube of glucose gel · Hard candy (such as 3 Jolly Ranchers or 5 to [employer]) · ½ cup to ¾ cup (4 to 6 ounces) of fruit juice or regular (not diet) soda High blood sugar If you have symptoms of high blood sugar, check your blood sugar. Your goal is to get your level back to your target range. If it's above ______ ( for example, above 250), follow these steps: · If you missed a dose of your diabetes medicine, take it now. Take only the amount of medicine that you have been prescribed. Do not take more or less medicine. · Give yourself insulin if your doctor has prescribed it for high blood sugar. · Test for ketones, if the doctor told you to do so. If the results of the ketone test show a moderate-to-large amount of ketones, call the doctor for advice. · Wait 30 minutes after you take the extra insulin or the missed medicine. Check your blood sugar again. If your symptoms or blood sugar levels are getting worse or have not improved after taking these steps, seek medical care right away. Follow-up care is a key part of your treatment and safety. Be sure to make and go to all appointments, and call your doctor if you are having problems. It's also a good idea to know your test results and keep a list of the medicines you take. Where can you learn more? Go to http://delfino-cyndi.info/. Enter H213 in the search box to learn more about \"Diabetes Blood Sugar Emergencies: Your Action Plan. \" Current as of: July 25, 2018 Content Version: 11.9 © 0446-0395 [employer].  Care instructions adapted under license by [address] (which disclaims liability or warranty for this information). If you have questions about a medical condition or this instruction, always ask your healthcare professional. [address] any warranty or liability for your use of this information. Learning About Type 2 Diabetes What is type 2 diabetes? Insulin is a hormone that helps your body use sugar from your food as energy. Type 2 diabetes happens when your body can't use insulin the right way. Over time, the pancreas can't make enough insulin. If you don't have enough insulin, too much sugar stays in your blood. If you are overweight, get little or no exercise, or have type 2 diabetes in your family, you are more likely to have problems with the way insulin works in your body.  Americans, Hispanics, Native Americans,  Americans, and Pacific Islanders have a higher risk for type 2 diabetes. Type 2 diabetes can be prevented or delayed with a healthy lifestyle, which includes staying at a healthy weight, making smart food choices, and getting regular exercise. What can you expect with type 2 diabetes? Codi Joy keep hearing about how important it is to keep your blood sugar within a target range. That's because over time, high blood sugar can lead to serious problems. It can: 
· Harm your eyes, nerves, and kidneys. · Damage your blood vessels, leading to heart disease and stroke. · Reduce blood flow and cause nerve damage to parts of your body, especially your feet. This can cause slow healing and pain when you walk. · Make your immune system weak and less able to fight infections. When people hear the word \"diabetes,\" they often think of problems like these. But daily care and treatment can help prevent or delay these problems. The goal is to keep your blood sugar in a target range. That's the best way to reduce your chance of having more problems from diabetes. What are the symptoms? Some people who have type 2 diabetes may not have any symptoms early on. Many people with the disease don't even know they have it at first. But with time, diabetes starts to cause symptoms. You experience most symptoms of type 2 diabetes when your blood sugar is either too high or too low. The most common symptoms of high blood sugar include: · Thirst. 
· Frequent urination. · Weight loss. · Blurry vision. The symptoms of low blood sugar include: · Sweating. · Shakiness. · Weakness. · Hunger. · Confusion. How can you prevent type 2 diabetes? The best way to prevent or delay type 2 diabetes is to adopt healthy habits, which include: 
· Staying at a healthy weight. · Exercising regularly. · Eating healthy foods. How is type 2 diabetes treated? If you have type 2 diabetes, here are the most important things you can do. · Take your diabetes medicines. · Check your blood sugar as often as your doctor recommends. Also, get a hemoglobin A1c test at least every 6 months. · Try to eat a variety of foods and to spread carbohydrate throughout the day. Carbohydrate raises blood sugar higher and more quickly than any other nutrient does. Carbohydrate is found in sugar, breads and cereals, fruit, starchy vegetables such as potatoes and corn, and milk and yogurt. · Get at least 30 minutes of exercise on most days of the week. Walking is a good choice. You also may want to do other activities, such as running, swimming, cycling, or playing tennis or team sports. If your doctor says it's okay, do muscle-strengthening exercises at least 2 times a week. · See your doctor for checkups and tests on a regular schedule. · If you have high blood pressure or high cholesterol, take the medicines as prescribed by your doctor. · Do not smoke. Smoking can make health problems worse. This includes problems you might have with type 2 diabetes.  If you need help quitting, talk to your doctor about stop-smoking programs and medicines. These can increase your chances of quitting for good. Follow-up care is a key part of your treatment and safety. Be sure to make and go to all appointments, and call your doctor if you are having problems. It's also a good idea to know your test results and keep a list of the medicines you take. Where can you learn more? Go to http://delfino-cyndi.info/. Enter S064 in the search box to learn more about \"Learning About Type 2 Diabetes. \" Current as of: July 25, 2018 Content Version: 11.9 © 0786-3969 [employer]. Care instructions adapted under license by [employer] (which disclaims liability or warranty for this information). If you have questions about a medical condition or this instruction, always ask your healthcare professional. [address] any warranty or liability for your use of this information.

## 2019-12-09 NOTE — PROGRESS NOTE ADULT - SUBJECTIVE AND OBJECTIVE BOX
Patient is a 69y old  Female who presents with a chief complaint of GI bleed (08 Dec 2019 09:27)   SOB    INTERVAL HPI/OVERNIGHT EVENTS: Patient seen and examined at bedside. Overnight, patient complained of severe headache, was given morphine 1mg as per pain scale management. Patient was also given amlodipine for HTN (157/77), as she missed her morning dose yesterday due to low BP (98/64). This morning, patient still complains of headache, and is visibly uncomfortable. She complains of severe headache with nausea. Patient denies fever, chills, chest pain, SOB, abdominal pain, vomiting, diarrhea, constipation.    MEDICATIONS  (STANDING):  amLODIPine   Tablet 2.5 milliGRAM(s) Oral daily  buPROPion XL . 300 milliGRAM(s) Oral daily  cefTRIAXone   IVPB 2000 milliGRAM(s) IV Intermittent every 24 hours  dicyclomine 10 milliGRAM(s) Oral three times a day before meals  levothyroxine 75 MICROGram(s) Oral daily  metroNIDAZOLE  IVPB      metroNIDAZOLE  IVPB 500 milliGRAM(s) IV Intermittent every 8 hours  pantoprazole    Tablet 40 milliGRAM(s) Oral before breakfast  potassium chloride    Tablet ER 40 milliEquivalent(s) Oral once    MEDICATIONS  (PRN):  acetaminophen   Tablet .. 650 milliGRAM(s) Oral every 6 hours PRN Temp greater or equal to 38C (100.4F), Mild Pain (1 - 3)  acetaminophen 300 mG/butalbital 50 mG/ caffeine 40 mG 1 Capsule(s) Oral every 8 hours PRN migraine  morphine  - Injectable 1 milliGRAM(s) IV Push every 4 hours PRN Severe Pain (7 - 10)  ondansetron Injectable 4 milliGRAM(s) IV Push every 6 hours PRN Nausea and/or Vomiting      Allergies    Cipro (Hives)  codeine (Vomiting)    Intolerances    tramadol (Vomiting)      REVIEW OF SYSTEMS:  CONSTITUTIONAL: No fever, weight loss, or fatigue  EYES: No eye pain, visual disturbances, or discharge  ENMT:  No difficulty hearing, tinnitus, vertigo; No sinus or throat pain  RESPIRATORY: No cough, wheezing, chills, No shortness of breath  CARDIOVASCULAR: No chest pain, palpitations, dizziness, or leg swelling  GASTROINTESTINAL: No abdominal or epigastric pain. Patient admits nausea. Admits minor blood in stool, mostly brown this morning.   NEUROLOGICAL: Admits headache, photophobia.   MUSCULOSKELETAL: No joint pain or swelling; No muscle, back, or extremity pain      Vital Signs Last 24 Hrs  T(C): 36.8 (09 Dec 2019 07:27), Max: 37 (08 Dec 2019 11:20)  T(F): 98.2 (09 Dec 2019 07:27), Max: 98.6 (08 Dec 2019 11:20)  HR: 67 (09 Dec 2019 07:27) (66 - 84)  BP: 131/74 (09 Dec 2019 07:27) (116/67 - 157/77)  BP(mean): --  RR: 17 (09 Dec 2019 07:27) (16 - 18)  SpO2: 96% (09 Dec 2019 07:27) (93% - 97%)    PHYSICAL EXAM:  GENERAL: Patient is visibly uncomfortable, holding her head with eyes closed.   HEAD:  Atraumatic, Normocephalic  EYES: EOMI, conjunctiva and sclera clear  NECK: Supple, No Jugular Venous Distension, Normal thyroid  NERVOUS SYSTEM:  Alert & Oriented X3, Good concentration; Motor Strength 5/5 B/L upper and lower extremities  CHEST/LUNG: Clear to auscultation bilaterally; No rales, rhonchi, wheezing, or rubs  HEART: Regular rate and rhythm; No murmurs, rubs, or gallops  ABDOMEN: Soft, Nontender, Nondistended; Bowel sounds present  EXTREMITIES:  2+ Peripheral Pulses, No clubbing, cyanosis, or edema  LYMPH: No lymphadenopathy noted  SKIN: No rashes or lesions    LABS:                        12.6   4.92  )-----------( 252      ( 09 Dec 2019 06:38 )             38.6     09 Dec 2019 06:38    144    |  107    |  6      ----------------------------<  96     3.2     |  30     |  0.92     Ca    8.5        09 Dec 2019 06:38        CAPILLARY BLOOD GLUCOSE        BLOOD CULTURE    RADIOLOGY & ADDITIONAL TESTS:    Imaging Personally Reviewed:  [ x ] YES     Consultant(s) Notes Reviewed:  Yes    Care Discussed with Consultants/Other Providers: Yes

## 2019-12-10 ENCOUNTER — TRANSCRIPTION ENCOUNTER (OUTPATIENT)
Age: 69
End: 2019-12-10

## 2019-12-10 LAB
ANION GAP SERPL CALC-SCNC: 6 MMOL/L — SIGNIFICANT CHANGE UP (ref 5–17)
BUN SERPL-MCNC: 12 MG/DL — SIGNIFICANT CHANGE UP (ref 7–23)
CALCIUM SERPL-MCNC: 8.5 MG/DL — SIGNIFICANT CHANGE UP (ref 8.5–10.1)
CHLORIDE SERPL-SCNC: 111 MMOL/L — HIGH (ref 96–108)
CO2 SERPL-SCNC: 30 MMOL/L — SIGNIFICANT CHANGE UP (ref 22–31)
CREAT SERPL-MCNC: 1 MG/DL — SIGNIFICANT CHANGE UP (ref 0.5–1.3)
GLUCOSE SERPL-MCNC: 90 MG/DL — SIGNIFICANT CHANGE UP (ref 70–99)
HCT VFR BLD CALC: 38.2 % — SIGNIFICANT CHANGE UP (ref 34.5–45)
HGB BLD-MCNC: 12.8 G/DL — SIGNIFICANT CHANGE UP (ref 11.5–15.5)
MCHC RBC-ENTMCNC: 31.5 PG — SIGNIFICANT CHANGE UP (ref 27–34)
MCHC RBC-ENTMCNC: 33.5 GM/DL — SIGNIFICANT CHANGE UP (ref 32–36)
MCV RBC AUTO: 94.1 FL — SIGNIFICANT CHANGE UP (ref 80–100)
NRBC # BLD: 0 /100 WBCS — SIGNIFICANT CHANGE UP (ref 0–0)
PLATELET # BLD AUTO: 249 K/UL — SIGNIFICANT CHANGE UP (ref 150–400)
POTASSIUM SERPL-MCNC: 3.6 MMOL/L — SIGNIFICANT CHANGE UP (ref 3.5–5.3)
POTASSIUM SERPL-SCNC: 3.6 MMOL/L — SIGNIFICANT CHANGE UP (ref 3.5–5.3)
RBC # BLD: 4.06 M/UL — SIGNIFICANT CHANGE UP (ref 3.8–5.2)
RBC # FLD: 13.2 % — SIGNIFICANT CHANGE UP (ref 10.3–14.5)
SODIUM SERPL-SCNC: 147 MMOL/L — HIGH (ref 135–145)
WBC # BLD: 5.13 K/UL — SIGNIFICANT CHANGE UP (ref 3.8–10.5)
WBC # FLD AUTO: 5.13 K/UL — SIGNIFICANT CHANGE UP (ref 3.8–10.5)

## 2019-12-10 PROCEDURE — 99233 SBSQ HOSP IP/OBS HIGH 50: CPT

## 2019-12-10 RX ADMIN — Medication 100 MILLIGRAM(S): at 22:01

## 2019-12-10 RX ADMIN — SIMETHICONE 80 MILLIGRAM(S): 80 TABLET, CHEWABLE ORAL at 22:01

## 2019-12-10 RX ADMIN — PANTOPRAZOLE SODIUM 40 MILLIGRAM(S): 20 TABLET, DELAYED RELEASE ORAL at 06:23

## 2019-12-10 RX ADMIN — Medication 100 MILLIGRAM(S): at 14:15

## 2019-12-10 RX ADMIN — BUPROPION HYDROCHLORIDE 300 MILLIGRAM(S): 150 TABLET, EXTENDED RELEASE ORAL at 11:10

## 2019-12-10 RX ADMIN — CEFTRIAXONE 100 MILLIGRAM(S): 500 INJECTION, POWDER, FOR SOLUTION INTRAMUSCULAR; INTRAVENOUS at 06:24

## 2019-12-10 RX ADMIN — Medication 1 TABLET(S): at 14:15

## 2019-12-10 RX ADMIN — Medication 650 MILLIGRAM(S): at 07:20

## 2019-12-10 RX ADMIN — Medication 1 TABLET(S): at 06:24

## 2019-12-10 RX ADMIN — SIMETHICONE 80 MILLIGRAM(S): 80 TABLET, CHEWABLE ORAL at 14:15

## 2019-12-10 RX ADMIN — Medication 10 MILLIGRAM(S): at 17:03

## 2019-12-10 RX ADMIN — Medication 650 MILLIGRAM(S): at 06:24

## 2019-12-10 RX ADMIN — SIMETHICONE 80 MILLIGRAM(S): 80 TABLET, CHEWABLE ORAL at 08:52

## 2019-12-10 RX ADMIN — Medication 100 MILLIGRAM(S): at 06:02

## 2019-12-10 RX ADMIN — Medication 10 MILLIGRAM(S): at 06:26

## 2019-12-10 RX ADMIN — Medication 75 MICROGRAM(S): at 06:24

## 2019-12-10 RX ADMIN — AMLODIPINE BESYLATE 2.5 MILLIGRAM(S): 2.5 TABLET ORAL at 06:23

## 2019-12-10 RX ADMIN — Medication 1 TABLET(S): at 22:01

## 2019-12-10 RX ADMIN — Medication 10 MILLIGRAM(S): at 11:10

## 2019-12-10 NOTE — DISCHARGE NOTE PROVIDER - CARE PROVIDER_API CALL
Zi Melgoza)  Internal Medicine  [address] [place] [address]  Phone: [phone]  Fax: [phone]  Established Patient  Follow Up Time: 1-3 days    Milton Samuels)  Cardiovascular Disease  [address] [place] [address], [address]rd [place] [address]  Phone: [phone]  Fax: [phone]  Follow Up Time:     Jose L Barton)  Gastroenterology; Internal Medicine  [address], [address]  [place] [address]  Phone: [phone]  Fax: [phone]  Established Patient  Follow Up Time: 1-3 days Zi Melgoza)  Internal Medicine  [address] [place] [address]  Phone: [phone]  Fax: [phone]  Established Patient  Follow Up Time: 1-3 days    Milton Samuels)  Cardiovascular Disease  [address] [place] [address], [address]rd [place] [address]  Phone: [phone]  Fax: [phone]  Follow Up Time:     Jose L Barton)  Gastroenterology; Internal Medicine  [address], [address]  [place] [address]  Phone: [phone]  Fax: [phone]  Established Patient  Follow Up Time: 1-3 days    Angelina Bull)  Neurology  [address] [place] [address]  Phone: [phone]  Fax: [phone]  Follow Up Time: Zi Melgoza)  Internal Medicine  [address] [place] [address]  Phone: [phone]  Fax: [phone]  Established Patient  Follow Up Time: 1-3 days    Milton Samuels)  Cardiovascular Disease  [facility], [address]rd [place] [address]  Phone: [phone]  Fax: [phone]  Follow Up Time:     Angelina Bull)  Neurology  [address] [place] [address]  Phone: [phone]  Fax: [phone]  Follow Up Time:     Esme Álvarez)  Gastroenterology  [address] [place] [address]  Phone: [phone]  Fax: [phone]  Follow Up Time:

## 2019-12-10 NOTE — PROGRESS NOTE ADULT - SUBJECTIVE AND OBJECTIVE BOX
Neurology follow up note    TREASURE DIAL69yFemale      Interval History:    Patient feels ok no new complaints.    MEDICATIONS    acetaminophen   Tablet .. 650 milliGRAM(s) Oral every 6 hours PRN  acetaminophen 300 mG/butalbital 50 mG/ caffeine 40 mG 1 Capsule(s) Oral every 8 hours PRN  amLODIPine   Tablet 2.5 milliGRAM(s) Oral daily  buPROPion XL . 300 milliGRAM(s) Oral daily  cefTRIAXone   IVPB 2000 milliGRAM(s) IV Intermittent every 24 hours  dicyclomine 10 milliGRAM(s) Oral three times a day before meals  lactobacillus acidophilus 1 Tablet(s) Oral three times a day  levothyroxine 75 MICROGram(s) Oral daily  metroNIDAZOLE  IVPB      metroNIDAZOLE  IVPB 500 milliGRAM(s) IV Intermittent every 8 hours  morphine  - Injectable 1 milliGRAM(s) IV Push every 4 hours PRN  ondansetron Injectable 4 milliGRAM(s) IV Push every 6 hours PRN  pantoprazole    Tablet 40 milliGRAM(s) Oral before breakfast  simethicone 80 milliGRAM(s) Chew three times a day      Allergies    Cipro (Hives)  codeine (Vomiting)    Intolerances    tramadol (Vomiting)          Vital Signs Last 24 Hrs  T(C): 37.1 (10 Dec 2019 11:39), Max: 37.1 (10 Dec 2019 11:39)  T(F): 98.8 (10 Dec 2019 11:39), Max: 98.8 (10 Dec 2019 11:39)  HR: 66 (10 Dec 2019 11:39) (66 - 79)  BP: 108/75 (10 Dec 2019 11:39) (108/75 - 143/73)  BP(mean): --  RR: 16 (10 Dec 2019 11:39) (16 - 19)  SpO2: 95% (10 Dec 2019 11:39) (93% - 97%)      REVIEW OF SYSTEMS:     Constitutional: No fever, chills, fatigue, weakness  Eyes: no eye pain, visual disturbances, or discharge  ENT:  No difficulty hearing, tinnitus, vertigo; No sinus or throat pain  Neck: No pain or stiffness  Respiratory: No cough, dyspnea, wheezing   Cardiovascular: No chest pain, palpitations,   Gastrointestinal: No abdominal or epigastric pain. No nausea, vomiting  No diarrhea or constipation.   Genitourinary: No dysuria, frequency, hematuria or incontinence  Neurological: No headaches, lightheadedness, vertigo, numbness or tremors  Psychiatric: No depression, anxiety, mood swings or difficulty sleeping  Musculoskeletal: No joint pain or swelling; No muscle, back or extremity pain  Skin: No itching, burning, rashes or lesions   Lymph Nodes: No enlarged glands  Endocrine: No heat or cold intolerance; No hair loss   Allergy and Immunologic: No hives or eczema    On Neurological Examination:     The patient is awake, alert, oriented x3.  Extraocular movements were intact.  Pupils equal, round, and reactive bilaterally 3 mm to 2.  Speech was fluent.  Smile symmetric.  Motor, bilateral upper and lower 5/5.  Sensory:  Bilateral upper and lower intact to light touch.  No drift.    Follow simple commands  Follow complex commands    GENERAL Exam: Nontoxic , No Acute Distress   	  HEENT:  normocephalic, atraumatic  		  LUNGS: Clear bilaterally      HEART: Normal S1S2   No murmur RRR        	  GI/ ABDOMEN:  Soft  Non tender    EXTREMITIES:   No Edema  No Clubbing  No Cyanosis     MUSCULOSKELETAL: Normal Range of Motion   	   SKIN: Normal  No Ecchymosis               LABS:  CBC Full  -  ( 10 Dec 2019 07:14 )  WBC Count : 5.13 K/uL  RBC Count : 4.06 M/uL  Hemoglobin : 12.8 g/dL  Hematocrit : 38.2 %  Platelet Count - Automated : 249 K/uL  Mean Cell Volume : 94.1 fl  Mean Cell Hemoglobin : 31.5 pg  Mean Cell Hemoglobin Concentration : 33.5 gm/dL  Auto Neutrophil # : x  Auto Lymphocyte # : x  Auto Monocyte # : x  Auto Eosinophil # : x  Auto Basophil # : x  Auto Neutrophil % : x  Auto Lymphocyte % : x  Auto Monocyte % : x  Auto Eosinophil % : x  Auto Basophil % : x      12-10    147<H>  |  111<H>  |  12  ----------------------------<  90  3.6   |  30  |  1.00    Ca    8.5      10 Dec 2019 07:14      Hemoglobin A1C:       Vitamin B12         RADIOLOGY    ANALYSIS AND PLAN:  This is a 69-year-old with headaches.  1.	For headaches, most likely secondary to migraine exacerbation secondary to general medical issues.  Secondary to patient having underlying GI bleed for now, I will hold off on nonsteroidals.  The patient had received Fioricet which seems to be helping with her headaches.  For now I will continue the patient on her Fioricet as needed  2.	overall doing better headache wise   3.	Monitor hemoglobin/hematocrit.  4.	GI workup as needed.  5.	For history of hypothyroidism, continue the patient on Synthroid.  6.	Spoke with the patient's spouse in great detail.  They understand my reasoning and thought process.    Greater than 36 minutes spent in direct patient care reviewing  the notes, lab data/ imaging , discussion with multidisciplinary team.

## 2019-12-10 NOTE — PROGRESS NOTE ADULT - PROBLEM SELECTOR PLAN 2
suspect 2/2 colitis; now resolved  trend cbc, transfuse prn  ppi daily for gi ppx   op colon once improved

## 2019-12-10 NOTE — PROGRESS NOTE ADULT - PROBLEM SELECTOR PLAN 2
- Suspect secondary to colitis.   - CT with findings of uncomplicated colitis  - last EGD/colonoscopy 2 years ago wnl  - Low fiber diet  - transfuse PRN for significant bleeding or hemodynamic instability.  - Continue bentyl, takes TID around the clock at home not PRN.   - Zofran PRN for nausea   - GI (Sidiridis) following  - H&H stable  - As per patient, no blood seen in last bowel movement.

## 2019-12-10 NOTE — PROGRESS NOTE ADULT - PROBLEM SELECTOR PLAN 1
Patient with severe headache starting yesterday morning. Received morphine overnight, headache still present this morning. Patient has hx of migraines which she typically treats with Tylenol.   - Started Fioricet Q8H PRN - patient responded well  - CT head showed no acute pathology  - Neurology consulted, Dr. Cruz. Likely a migraine exacerbation. Continue Fioricet PRN

## 2019-12-10 NOTE — PROGRESS NOTE ADULT - PROBLEM SELECTOR PLAN 1
clinically improving  gi pcr not sent   cont abx  cont bacid, bentyl  cont simethicone tid  monitor exam/gi fxn  advance diet as tolerated  will need op colonoscopy in 6-8 weeks once improved

## 2019-12-10 NOTE — PROGRESS NOTE ADULT - SUBJECTIVE AND OBJECTIVE BOX
Patient is a 69y old  Female who presents with a chief complaint of GI bleed (10 Dec 2019 11:58)   SOB    INTERVAL HPI/OVERNIGHT EVENTS: Patient seen and examined at bedside. No acute events overnight. Headache is improved this morning. Patient had 1 bowel movement yesterday, she did not notice any blood. Denies fever, chills, shortness of breath, chest pain, nausea, vomiting, diarrhea, constipation.     MEDICATIONS  (STANDING):  amLODIPine   Tablet 2.5 milliGRAM(s) Oral daily  buPROPion XL . 300 milliGRAM(s) Oral daily  cefTRIAXone   IVPB 2000 milliGRAM(s) IV Intermittent every 24 hours  dicyclomine 10 milliGRAM(s) Oral three times a day before meals  lactobacillus acidophilus 1 Tablet(s) Oral three times a day  levothyroxine 75 MICROGram(s) Oral daily  metroNIDAZOLE  IVPB      metroNIDAZOLE  IVPB 500 milliGRAM(s) IV Intermittent every 8 hours  pantoprazole    Tablet 40 milliGRAM(s) Oral before breakfast  simethicone 80 milliGRAM(s) Chew three times a day    MEDICATIONS  (PRN):  acetaminophen   Tablet .. 650 milliGRAM(s) Oral every 6 hours PRN Temp greater or equal to 38C (100.4F), Mild Pain (1 - 3)  acetaminophen 300 mG/butalbital 50 mG/ caffeine 40 mG 1 Capsule(s) Oral every 8 hours PRN migraine  morphine  - Injectable 1 milliGRAM(s) IV Push every 4 hours PRN Severe Pain (7 - 10)  ondansetron Injectable 4 milliGRAM(s) IV Push every 6 hours PRN Nausea and/or Vomiting      Allergies    Cipro (Hives)  codeine (Vomiting)    Intolerances    tramadol (Vomiting)      REVIEW OF SYSTEMS:  CONSTITUTIONAL: No fever, weight loss, or fatigue  EYES: No eye pain, visual disturbances, or discharge  ENMT:  No difficulty hearing, tinnitus, vertigo; No sinus or throat pain  NECK: No pain or stiffness  RESPIRATORY: No cough, wheezing, chills or hemoptysis; No shortness of breath  CARDIOVASCULAR: No chest pain, palpitations, dizziness, or leg swelling  GASTROINTESTINAL: No abdominal or epigastric pain. No nausea, vomiting, or hematemesis; No diarrhea or constipation. No melena or hematochezia.  GENITOURINARY: No dysuria, frequency, hematuria, or incontinence  NEUROLOGICAL: Headache resolved. No memory loss, loss of strength, numbness, or tremors  MUSCULOSKELETAL: No joint pain or swelling; No muscle, back, or extremity pain      Vital Signs Last 24 Hrs  T(C): 37.1 (10 Dec 2019 11:39), Max: 37.1 (10 Dec 2019 11:39)  T(F): 98.8 (10 Dec 2019 11:39), Max: 98.8 (10 Dec 2019 11:39)  HR: 66 (10 Dec 2019 11:39) (66 - 79)  BP: 108/75 (10 Dec 2019 11:39) (108/75 - 143/73)  BP(mean): --  RR: 16 (10 Dec 2019 11:39) (16 - 19)  SpO2: 95% (10 Dec 2019 11:39) (93% - 97%)    PHYSICAL EXAM:  GENERAL: No acute distress, well-groomed, well-developed  HEAD:  Atraumatic, Normocephalic  EYES: EOMI, conjunctiva and sclera clear  ENMT: Moist mucous membranes, Good dentition, No lesions  NECK: Supple, No Jugular Venous Distension, Normal thyroid  NERVOUS SYSTEM:  Alert & Oriented X3, Good concentration; Motor Strength 5/5 B/L upper and lower extremities  CHEST/LUNG: Clear to auscultation bilaterally; No rales, rhonchi, wheezing, or rubs  HEART: Regular rate and rhythm; No murmurs, rubs, or gallops  ABDOMEN: Soft, Nontender, Nondistended; Bowel sounds present  EXTREMITIES:  2+ Peripheral Pulses, No clubbing, cyanosis, or edema      LABS:                        12.8   5.13  )-----------( 249      ( 10 Dec 2019 07:14 )             38.2     10 Dec 2019 07:14    147    |  111    |  12     ----------------------------<  90     3.6     |  30     |  1.00     Ca    8.5        10 Dec 2019 07:14        CAPILLARY BLOOD GLUCOSE        BLOOD CULTURE    RADIOLOGY & ADDITIONAL TESTS:    Imaging Personally Reviewed:  [ ] YES     Consultant(s) Notes Reviewed:      Care Discussed with Consultants/Other Providers:

## 2019-12-10 NOTE — DISCHARGE NOTE PROVIDER - PROVIDER TOKENS
PROVIDER:[[id number]],FOLLOWUP:[1-3 days],ESTABLISHEDPATIENT:[T]],PROVIDER:[TOKEN:[[id number]]],PROVIDER:[TOKEN:[[id number]],FOLLOWUP:[1-3 days],ESTABLISHEDPATIENT:[T]] PROVIDER:[TOKEN:[[id number]],FOLLOWUP:[1-3 days],ESTABLISHEDPATIENT:[T]],PROVIDER:[TOKEN:[[id number]]],PROVIDER:[TOKEN:[[id number]],FOLLOWUP:[1-3 days],ESTABLISHEDPATIENT:[T]],PROVIDER:[TOKEN:[[id number]]] PROVIDER:[TOKEN:[[id number]],FOLLOWUP:[1-3 days],ESTABLISHEDPATIENT:[T]],PROVIDER:[TOKEN:[[id number]]],PROVIDER:[TOKEN:[[id number]]],PROVIDER:[TOKEN:[[id number]]]

## 2019-12-10 NOTE — PROGRESS NOTE ADULT - PROBLEM SELECTOR PROBLEM 3
" VSS,except  soft BP,which is baseline for this pt.Pt, had one emesis 700 ml writer noted that pt.is eating large amounts of food ,writer advised pt. to slow down  And  eat small frequent mels. Patient stated  \"The food does not maker nauseas  It is fluid\",IV Zofran given per request.pt also c/o constant pain and oxycodone 5 mg given per  scheduled and per PRN order.Pt. is up indep. reported three loose BM and adequate urination. Pt. walks out frequently to smoke.Will continue to monitor.  " ACP (advance care planning)

## 2019-12-10 NOTE — PROGRESS NOTE ADULT - ASSESSMENT
Patient is a 68 y/o female with PMH of IBS, frequent vasovagal syncope after bowel movements, HTN, hypothyroidism. generalized anxiety disorder who presents c/o bright red blood per rectum since last night, admitted for management of GI bleed suspect secondary to colitis.

## 2019-12-10 NOTE — PROGRESS NOTE ADULT - PROBLEM SELECTOR PLAN 8
-Prominent endometrial echo complex measuring 1.5 cm  -Discuss with patient and advise her to see GYN for further evaluation   - Continued faint hyperdensity along the fundal portion of the gallbladder, likely represent adenomyomatosis  -Consider outpatient workup for gallstones with RUQ U/S, patient currently not c/o symptoms of biliary colic

## 2019-12-10 NOTE — DISCHARGE NOTE PROVIDER - CARE PROVIDERS DIRECT ADDRESSES
,DirectAddress_Unknown,DirectAddress_Unknown,DirectAddress_Unknown ,DirectAddress_Unknown,DirectAddress_Unknown,DirectAddress_Unknown,DirectAddress_Unknown ,DirectAddress_Unknown,DirectAddress_Unknown,DirectAddress_Unknown,[email or web address]

## 2019-12-10 NOTE — PROGRESS NOTE ADULT - SUBJECTIVE AND OBJECTIVE BOX
INTERVAL HPI/OVERNIGHT EVENTS:  pt seen and examined  feeling better  denies n/v  abd pain improved  passed soft non bloody stool  tolerating liquids    MEDICATIONS  (STANDING):  amLODIPine   Tablet 2.5 milliGRAM(s) Oral daily  buPROPion XL . 300 milliGRAM(s) Oral daily  cefTRIAXone   IVPB 2000 milliGRAM(s) IV Intermittent every 24 hours  dicyclomine 10 milliGRAM(s) Oral three times a day before meals  lactobacillus acidophilus 1 Tablet(s) Oral three times a day  levothyroxine 75 MICROGram(s) Oral daily  metroNIDAZOLE  IVPB      metroNIDAZOLE  IVPB 500 milliGRAM(s) IV Intermittent every 8 hours  pantoprazole    Tablet 40 milliGRAM(s) Oral before breakfast  simethicone 80 milliGRAM(s) Chew three times a day    MEDICATIONS  (PRN):  acetaminophen   Tablet .. 650 milliGRAM(s) Oral every 6 hours PRN Temp greater or equal to 38C (100.4F), Mild Pain (1 - 3)  acetaminophen 300 mG/butalbital 50 mG/ caffeine 40 mG 1 Capsule(s) Oral every 8 hours PRN migraine  morphine  - Injectable 1 milliGRAM(s) IV Push every 4 hours PRN Severe Pain (7 - 10)  ondansetron Injectable 4 milliGRAM(s) IV Push every 6 hours PRN Nausea and/or Vomiting      Allergies    Cipro (Hives)  codeine (Vomiting)    Intolerances    tramadol (Vomiting)      Review of Systems:    General:  No wt loss, fevers, chills, night sweats, fatigue   Eyes:  Good vision, no reported pain  ENT:  No sore throat, pain, runny nose, dysphagia  CV:  No pain, palpitations, hypo/hypertension  Resp:  No dyspnea, cough, tachypnea, wheezing  GI:  No pain, No nausea, No vomiting, No diarrhea, No constipation, No weight loss, No fever, No pruritis, No rectal bleeding, No melena, No dysphagia  :  No pain, bleeding, incontinence, nocturia  Muscle:  No pain, weakness  Neuro:  No weakness, tingling, memory problems  Psych:  No fatigue, insomnia, mood problems, depression  Endocrine:  No polyuria, polydypsia, cold/heat intolerance  Heme:  No petechiae, ecchymosis, easy bruisability  Skin:  No rash, tattoos, scars, edema      Vital Signs Last 24 Hrs  T(C): 36.7 (10 Dec 2019 08:16), Max: 36.9 (10 Dec 2019 04:25)  T(F): 98 (10 Dec 2019 08:16), Max: 98.4 (10 Dec 2019 04:25)  HR: 68 (10 Dec 2019 08:16) (66 - 79)  BP: 126/78 (10 Dec 2019 08:16) (112/70 - 143/73)  BP(mean): --  RR: 16 (10 Dec 2019 08:16) (16 - 19)  SpO2: 94% (10 Dec 2019 08:16) (93% - 97%)    PHYSICAL EXAM:    Constitutional: lying in bed  HEENT: ncat  Neck: No LAD  Gastrointestinal: soft nt nd  Extremities: No peripheral edema  Neurological: A/O x 3      LABS:                        12.8   5.13  )-----------( 249      ( 10 Dec 2019 07:14 )             38.2     12-10    147<H>  |  111<H>  |  12  ----------------------------<  90  3.6   |  30  |  1.00    Ca    8.5      10 Dec 2019 07:14            RADIOLOGY & ADDITIONAL TESTS:

## 2019-12-10 NOTE — DISCHARGE NOTE PROVIDER - NSDCMRMEDTOKEN_GEN_ALL_CORE_FT
dicyclomine 10 mg oral capsule: 10 milligram(s) orally 3 times a day  Norvasc 2.5 mg oral tablet: 1 tab(s) orally once a day  Synthroid 75 mcg (0.075 mg) oral tablet: 1 tab(s) orally once a day  Wellbutrin: 300 milligram(s) orally once a day Acidophilus oral capsule: 1 cap(s) orally 3 times a day (with meals)   butalbital/acetaminophen/caffeine 50 mg-300 mg-40 mg oral capsule: 1 cap(s) orally every 8 hours -for headache   cefpodoxime 200 mg oral tablet: 1 tab(s) orally 2 times a day   dicyclomine 10 mg oral capsule: 10 milligram(s) orally 3 times a day  Flagyl 500 mg oral tablet: 1 tab(s) orally 3 times a day   Norvasc 2.5 mg oral tablet: 1 tab(s) orally once a day  Synthroid 75 mcg (0.075 mg) oral tablet: 1 tab(s) orally once a day  Wellbutrin: 300 milligram(s) orally once a day

## 2019-12-10 NOTE — DISCHARGE NOTE PROVIDER - HOSPITAL COURSE
From Admission H+P:    Patient is a 68 y/o female with PMH of IBS, frequent vasovagal syncope after bowel movements, HTN, hypothyroidism, generalized anxiety disorder who presented c/o bright red blood per rectum. This was preceeded by crampy abd pain and nausea, dizziness and proceeded to syncopize for unknown period of time, no head injury. She awoke to nausea and had NBNB emesis and diarrhea with scant blood. No previous hx of bloody stool, last colonoscopy 2017 wnl. Takes dicyclomine for IBS symptoms and has had a reduction in vasovagal episodes since that time. No fever, chills SOB n/v/d, chest pain/jaw pain.         In the ED,     VS: T: 97.5, HR:101-->100, BP: 177/77--->147/80, RR: 18, SpO2: 98% on room air     Labs were significant for: H/H: 14/42.5, Serum glucose: 113, cardiac enzymes negative x 1     EKG: Normal sinus rhythm    CXR: official read pending, clear compared to previous     CT A/P: Uncomplicated colitis, most pronounced at descending colonic loop, no obstruction or extraluminal collection. Prominent endometrial echo complex 1.5cm. Faint hyperdensity along fundal gallbladder.    Patient received 3L NS bolus, morphine 2mg IVP x 1, Zofran 4 mg IVP x 1         Hospital course:    Admitted to [facility] for further management of GIB and syncope. Found to have colitis on CT, started on Rocephin and flagyl. She was evaluated by GI (Dr Phoenix), agreed with abx regimen, GIB unimpressive and likely secondary to colitis given that H/H remained stable. Outpatient colonoscopy was recommended in 6-8 weeks. She improved throughout her hospital course, and was able to tolerate clears and eventually regular diet. Course complicated by headache, patient endorsed hx of migraine headaches, and was started on fioricet, at which point her headaches resolved. She continued to improve throughout her hospital course, and was stable for d/c to home. From Admission H+P:    Patient is a 68 y/o female with PMH of IBS, frequent vasovagal syncope after bowel movements, HTN, hypothyroidism, generalized anxiety disorder who presented c/o bright red blood per rectum. This was preceeded by crampy abd pain and nausea, dizziness and proceeded to syncopize for unknown period of time, no head injury. She awoke to nausea and had NBNB emesis and diarrhea with scant blood. No previous hx of bloody stool, last colonoscopy 2017 wnl. Takes dicyclomine for IBS symptoms and has had a reduction in vasovagal episodes since that time. No fever, chills SOB n/v/d, chest pain/jaw pain.         In the ED,     VS: T: 97.5, HR:101-->100, BP: 177/77--->147/80, RR: 18, SpO2: 98% on room air     Labs were significant for: H/H: 14/42.5, Serum glucose: 113, cardiac enzymes negative x 1     EKG: Normal sinus rhythm    CXR: official read pending, clear compared to previous     CT A/P: Uncomplicated colitis, most pronounced at descending colonic loop, no obstruction or extraluminal collection. Prominent endometrial echo complex 1.5cm. Faint hyperdensity along fundal gallbladder.    Patient received 3L NS bolus, morphine 2mg IVP x 1, Zofran 4 mg IVP x 1         Hospital course:    Admitted to [facility] for further management of GIB and syncope. Found to have colitis on CT, started on Rocephin and flagyl. She was evaluated by GI (Dr Phoenix), agreed with abx regimen, GIB unimpressive and likely secondary to colitis given that H/H remained stable. Outpatient colonoscopy was recommended in 6-8 weeks. She improved throughout her hospital course, and was able to tolerate clears and eventually regular diet. Course complicated by headache, patient endorsed hx of migraine headaches, and was started on fioricet, at which point her headaches resolved. She continued to improve throughout her hospital course, and was stable for d/c to home.         Patient was seen and examined on the day of discharge, stable for discharge home without any additional home care needs.        Vital Signs Last 24 Hrs    T(C): 36.7 (10 Dec 2019 08:16), Max: 36.9 (10 Dec 2019 04:25)    T(F): 98 (10 Dec 2019 08:16), Max: 98.4 (10 Dec 2019 04:25)    HR: 68 (10 Dec 2019 08:16) (66 - 79)    BP: 126/78 (10 Dec 2019 08:16) (112/70 - 143/73)    BP(mean): --    RR: 16 (10 Dec 2019 08:16) (16 - 19)    SpO2: 94% (10 Dec 2019 08:16) (93% - 97%)        PHYSICAL EXAM:    GENERAL: NAD    HEAD:  Atraumatic, Normocephalic    EYES: EOMI, conjunctiva and sclera clear    NECK: Supple    NERVOUS SYSTEM:  Alert & Oriented X3, Good concentration    CHEST/LUNG: Clear to auscultation bilaterally; No rales, rhonchi, wheezing, or rubs    HEART: RRR, S1/S2; No murmurs, rubs, or gallops    ABDOMEN: Soft, Nontender, Nondistended; Bowel sounds present 4 quads    EXTREMITIES:  2+ Peripheral Pulses b/l radial, PT, No LE edema    SKIN: warm and dry From Admission H+P:    Patient is a 68 y/o female with PMH of IBS, frequent vasovagal syncope after bowel movements, HTN, hypothyroidism, generalized anxiety disorder who presented c/o bright red blood per rectum. This was preceeded by crampy abd pain and nausea, dizziness and proceeded to syncopize for unknown period of time, no head injury. She awoke to nausea and had NBNB emesis and diarrhea with scant blood. No previous hx of bloody stool, last colonoscopy 2017 wnl. Takes dicyclomine for IBS symptoms and has had a reduction in vasovagal episodes since that time. No fever, chills SOB n/v/d, chest pain/jaw pain.         In the ED,     VS: T: 97.5, HR:101-->100, BP: 177/77--->147/80, RR: 18, SpO2: 98% on room air     Labs were significant for: H/H: 14/42.5, Serum glucose: 113, cardiac enzymes negative x 1     EKG: Normal sinus rhythm    CXR: official read pending, clear compared to previous     CT A/P: Uncomplicated colitis, most pronounced at descending colonic loop, no obstruction or extraluminal collection. Prominent endometrial echo complex 1.5cm. Faint hyperdensity along fundal gallbladder.    Patient received 3L NS bolus, morphine 2mg IVP x 1, Zofran 4 mg IVP x 1         Hospital course:    Admitted to [facility] for further management of GIB and syncope. Found to have colitis on CT, started on Rocephin and flagyl. She was evaluated by GI (Dr Phoenix), agreed with abx regimen, GIB unimpressive and likely secondary to colitis given that H/H remained stable. Outpatient colonoscopy was recommended in 6-8 weeks. She improved throughout her hospital course, and was able to tolerate clears and eventually regular diet. Course complicated by headache, patient endorsed hx of migraine headaches, and was started on fioricet, at which point her headaches improved. Evaluated for headaches by neurology (Dr Cruz), attributed to migraine exacerbation. She continued to improve throughout her hospital course, and was stable for d/c to home.         Patient was seen and examined on the day of discharge, stable for discharge home without any additional home care needs.        Vital Signs Last 24 Hrs    T(C): 36.7 (10 Dec 2019 08:16), Max: 36.9 (10 Dec 2019 04:25)    T(F): 98 (10 Dec 2019 08:16), Max: 98.4 (10 Dec 2019 04:25)    HR: 68 (10 Dec 2019 08:16) (66 - 79)    BP: 126/78 (10 Dec 2019 08:16) (112/70 - 143/73)    BP(mean): --    RR: 16 (10 Dec 2019 08:16) (16 - 19)    SpO2: 94% (10 Dec 2019 08:16) (93% - 97%)        PHYSICAL EXAM:    GENERAL: NAD    HEAD:  Atraumatic, Normocephalic    EYES: EOMI, conjunctiva and sclera clear    NECK: Supple    NERVOUS SYSTEM:  Alert & Oriented X3, Good concentration    CHEST/LUNG: Clear to auscultation bilaterally; No rales, rhonchi, wheezing, or rubs    HEART: RRR, S1/S2; No murmurs, rubs, or gallops    ABDOMEN: Soft, Nontender, Nondistended; Bowel sounds present 4 quads    EXTREMITIES:  2+ Peripheral Pulses b/l radial, PT, No LE edema    SKIN: warm and dry From Admission H+P:    Patient is a 70 y/o female with PMH of IBS, frequent vasovagal syncope after bowel movements, HTN, hypothyroidism, generalized anxiety disorder who presented c/o bright red blood per rectum. This was preceeded by crampy abd pain and nausea, dizziness and proceeded to syncopize for unknown period of time, no head injury. She awoke to nausea and had NBNB emesis and diarrhea with scant blood. No previous hx of bloody stool, last colonoscopy 2017 wnl. Takes dicyclomine for IBS symptoms and has had a reduction in vasovagal episodes since that time. No fever, chills SOB n/v/d, chest pain/jaw pain.         In the ED,     VS: T: 97.5, HR:101-->100, BP: 177/77--->147/80, RR: 18, SpO2: 98% on room air     Labs were significant for: H/H: 14/42.5, Serum glucose: 113, cardiac enzymes negative x 1     EKG: Normal sinus rhythm    CXR: official read pending, clear compared to previous     CT A/P: Uncomplicated colitis, most pronounced at descending colonic loop, no obstruction or extraluminal collection. Prominent endometrial echo complex 1.5cm. Faint hyperdensity along fundal gallbladder.    Patient received 3L NS bolus, morphine 2mg IVP x 1, Zofran 4 mg IVP x 1         Hospital course:    Admitted to [facility] for further management of GIB and syncope. Found to have colitis on CT, started on Rocephin and flagyl. She was evaluated by GI (Dr Phoenix), agreed with abx regimen, GIB unimpressive and likely secondary to colitis given that H/H remained stable. Outpatient colonoscopy was recommended in 6-8 weeks. She improved throughout her hospital course, and was able to tolerate clears and eventually regular diet. Course complicated by headache, patient endorsed hx of migraine headaches, and was started on fioricet, at which point her headaches improved. Evaluated for headaches by neurology (Dr Cruz), attributed to migraine exacerbation. She continued to improve throughout her hospital course, and was stable for d/c to home.         Patient was seen and examined on the day of discharge, stable for discharge home without any additional home care needs. From Admission H+P:    Patient is a 68 y/o female with PMH of IBS, frequent vasovagal syncope after bowel movements, HTN, hypothyroidism, generalized anxiety disorder who presented c/o bright red blood per rectum. This was preceeded by crampy abd pain and nausea, dizziness and proceeded to syncopize for unknown period of time, no head injury. She awoke to nausea and had NBNB emesis and diarrhea with scant blood. No previous hx of bloody stool, last colonoscopy 2017 wnl. Takes dicyclomine for IBS symptoms and has had a reduction in vasovagal episodes since that time. No fever, chills SOB n/v/d, chest pain/jaw pain.         In the ED,     VS: T: 97.5, HR:101-->100, BP: 177/77--->147/80, RR: 18, SpO2: 98% on room air     Labs were significant for: H/H: 14/42.5, Serum glucose: 113, cardiac enzymes negative x 1     EKG: Normal sinus rhythm    CXR: official read pending, clear compared to previous     CT A/P: Uncomplicated colitis, most pronounced at descending colonic loop, no obstruction or extraluminal collection. Prominent endometrial echo complex 1.5cm. Faint hyperdensity along fundal gallbladder.    Patient received 3L NS bolus, morphine 2mg IVP x 1, Zofran 4 mg IVP x 1         Hospital course:    Admitted to [facility] for further management of GIB and syncope. Found to have colitis on CT, started on Rocephin and flagyl. She was evaluated by GI (Dr Phoenix), agreed with abx regimen, GIB unimpressive and likely secondary to colitis given that H/H remained stable. Outpatient colonoscopy was recommended in 6-8 weeks. She improved throughout her hospital course, and was able to tolerate clears and eventually regular diet. Course complicated by headache, patient endorsed hx of migraine headaches, and was started on fioricet, at which point her headaches improved. Evaluated for headaches by neurology (Dr Cruz), attributed to migraine exacerbation. She continued to improve throughout her hospital course, and was stable for d/c to home.         Patient was seen and examined on the day of discharge, stable for discharge home without any additional home care needs.        ROS on discharge: Patient denies fever, chills, chest pain, shortness of breath, nausea, vomiting, diarrhea, constipation, blood in stool, melena, dysuria.        Vital Signs Last 24 Hrs    T(C): 37 (12 Dec 2019 07:29), Max: 37 (12 Dec 2019 07:29)    T(F): 98.6 (12 Dec 2019 07:29), Max: 98.6 (12 Dec 2019 07:29)    HR: 82 (12 Dec 2019 07:29) (70 - 84)    BP: 148/80 (12 Dec 2019 07:29) (105/68 - 148/80)    BP(mean): --    RR: 17 (12 Dec 2019 07:29) (14 - 17)    SpO2: 98% (12 Dec 2019 07:29) (94% - 98%)        Physical Exam:    General: Well developed, well nourished, NAD    HEENT: NCAT, EOMI bl, moist mucous membranes     Neck: Supple, nontender, no mass    Neurology: A&Ox3, nonfocal, CN II-XII grossly intact, sensation intact    Respiratory: CTA B/L, No W/R/R    CV: RRR, +S1/S2, no murmurs, rubs or gallops    Abdominal: Soft, NT, ND +BSx4    Extremities: No C/C/E, + peripheral pulses    MSK: Normal ROM, no joint erythema or warmth, no joint swelling     Skin: warm, dry, normal color, no rash or abnormal lesions From Admission H+P:    Patient is a 70 y/o female with PMH of IBS, frequent vasovagal syncope after bowel movements, HTN, hypothyroidism, generalized anxiety disorder who presented c/o bright red blood per rectum. This was preceeded by crampy abd pain and nausea, dizziness and proceeded to syncopize for unknown period of time, no head injury. She awoke to nausea and had NBNB emesis and diarrhea with scant blood. No previous hx of bloody stool, last colonoscopy 2017 wnl. Takes dicyclomine for IBS symptoms and has had a reduction in vasovagal episodes since that time. No fever, chills SOB n/v/d, chest pain/jaw pain.             Hospital course:    Admitted to [facility] for further management of   lower GIB  cause of  vasovagal syncope episode  sec to volume  loss .     Found to have colitis on CT, started on Rocephin and flagyl     , stool cult positive for enteropathogenic  ecoli  cause of infectious colitis  . She was evaluated by GI (Dr Phoenix), agreed with abx regimen, GIB unimpressive and likely secondary to colitis given that H/H remained stable. Outpatient colonoscopy was recommended in 6-8 weeks. She improved throughout her hospital course, and was able to tolerate clears and eventually regular diet.     Course complicated by headache, patient endorsed hx of migraine headaches, and was started on fioricet, at which point her headaches improved. Evaluated for headaches by neurology (Dr Cruz), attributed to migraine exacerbation. She continued to improve throughout her hospital course, and was stable for d/c to home.     Patient was seen and examined on the day of discharge, stable for discharge home without any additional home care needs.    ROS on discharge:     Patient denies fever, chills, chest pain, shortness of breath, nausea, vomiting, diarrhea,     constipation, blood in stool, melena, dysuria.    12-12-19 physical exam day of dc     Vital Signs Last 24 Hrs    T(C): 37 (12 Dec 2019 07:29), Max: 37 (12 Dec 2019 07:29)    T(F): 98.6 (12 Dec 2019 07:29), Max: 98.6 (12 Dec 2019 07:29)    HR: 82 (12 Dec 2019 07:29) (70 - 84)    BP: 148/80 (12 Dec 2019 07:29) (105/68 - 148/80)    BP(mean): --    RR: 17 (12 Dec 2019 07:29) (14 - 17)    SpO2: 98% (12 Dec 2019 07:29) (94% - 98%)        Physical Exam:    General: Well developed, well nourished, NAD    HEENT: NCAT, EOMI bl, moist mucous membranes     Neck: Supple, nontender, no mass    Neurology: A&Ox3, nonfocal, CN II-XII grossly intact, sensation intact    Respiratory: CTA B/L, No W/R/R    CV: RRR, +S1/S2, no murmurs, no tachy     Abdominal: Soft, NT, ND +BSx4    Extremities: No C/C/E, + peripheral pulses    MSK: Normal ROM, no joint erythema or warmth, no joint swelling     Skin: warm, dry, normal color, no rash or abnormal lesions

## 2019-12-10 NOTE — DISCHARGE NOTE PROVIDER - NSDCFUADDINST_GEN_ALL_CORE_FT
Follow up with your primary care physician, Dr. Melgoza, within one week of discharge.  Follow up with your gastroenterologist, Dr. Álvarez, within one week of discharge.   Follow up with your neurologist, Dr. Bull.  Follow up with your cardiologist, Dr. Samuels.

## 2019-12-10 NOTE — DISCHARGE NOTE PROVIDER - NSDCCPCAREPLAN_GEN_ALL_CORE_FT
PRINCIPAL DISCHARGE DIAGNOSIS  Diagnosis: Vasovagal syncope  Assessment and Plan of Treatment: You were admitted to the hospital for an episode of syncope, or loss of consciousness. You have a known history of vasovagal syncope related to passing bowel movements, and have been worked up by your cardiologist Dr Samuels.      SECONDARY DISCHARGE DIAGNOSES  Diagnosis: GIB (gastrointestinal bleeding)  Assessment and Plan of Treatment: You were found to have a possible gastro-intestinal bleed    Diagnosis: IBS (irritable bowel syndrome)  Assessment and Plan of Treatment: IBS (irritable bowel syndrome)    Diagnosis: Imaging abnormalities  Assessment and Plan of Treatment: Imaging abnormalities    Diagnosis: Hypothyroidism  Assessment and Plan of Treatment: Hypothyroidism    Diagnosis: Colitis  Assessment and Plan of Treatment: Colitis    Diagnosis: Headache  Assessment and Plan of Treatment: Headache    Diagnosis: HTN (hypertension)  Assessment and Plan of Treatment: HTN (hypertension) PRINCIPAL DISCHARGE DIAGNOSIS  Diagnosis: Vasovagal syncope  Assessment and Plan of Treatment: You were admitted to the hospital for an episode of syncope, or loss of consciousness. You have a known history of vasovagal syncope related to passing bowel movements, and have been worked up by your cardiologist Dr Samuels.      SECONDARY DISCHARGE DIAGNOSES  Diagnosis: GIB (gastrointestinal bleeding)  Assessment and Plan of Treatment: You were found to have a possible gastro-intestinal bleed due to blood in the stool. You were evaluated by a gastro-enterologist, and the blood in the stool was attributed to colitis. You blood levels were monitored throughout your time in the hospital and were noted to be stable.  -You should follow with your GI in 3-5 days from discharge  -Have a repeat colonoscopy in 6-8 weeks from discharge    Diagnosis: IBS (irritable bowel syndrome)  Assessment and Plan of Treatment: Continue on bentyl      Diagnosis: Imaging abnormalities  Assessment and Plan of Treatment: You had a CT of the abdomen and pelvis in the hospital to evaluate the blood in your stool. There was an incidental finding of a 1.5cm echo complex in the endometrium, part of the uterus.   -Follow with your GYN within 1 week of discharge to evaluate further    Diagnosis: Hypothyroidism  Assessment and Plan of Treatment: Continue on levothyroxine 75 mcg daily    Diagnosis: Colitis  Assessment and Plan of Treatment: You were found to have Colitis on CT of the abdomen and pelvis, and were treated with IV antibiotics and improved.  -Follow with your GI and have repeat colonoscopy in 6-8 weeks to re-evaluate for any abnormalities.    Diagnosis: Headache  Assessment and Plan of Treatment: During your hospital stay you suffered from headaches and were started on fioricet as needed, and the headaches improved. They were likely    Diagnosis: HTN (hypertension)  Assessment and Plan of Treatment: Continue on amlodipine 2.5mg daily  -Check blood pressure regularly  -Follow a diet low in salt and saturated fats PRINCIPAL DISCHARGE DIAGNOSIS  Diagnosis: Vasovagal syncope  Assessment and Plan of Treatment: You were admitted to the hospital for an episode of syncope, or loss of consciousness. You have a known history of vasovagal syncope related to passing bowel movements, and have been worked up by your cardiologist Dr Samuels.      SECONDARY DISCHARGE DIAGNOSES  Diagnosis: GIB (gastrointestinal bleeding)  Assessment and Plan of Treatment: You were found to have a possible gastro-intestinal bleed due to blood in the stool. You were evaluated by a gastro-enterologist, and the blood in the stool was attributed to colitis. You blood levels were monitored throughout your time in the hospital and were noted to be stable.  -You should follow with your GI in 3-5 days from discharge  -Have a repeat colonoscopy in 6-8 weeks from discharge    Diagnosis: Colitis  Assessment and Plan of Treatment: You were found to have Colitis on CT of the abdomen and pelvis, and were treated with IV antibiotics and improved.  - Continue taking Flagyl 500mg one pill three times per day for two days  - Continue taking Vantin 200mg one pill twice per day for two days  -Follow with your GI and have repeat colonoscopy in 6-8 weeks to re-evaluate for any abnormalities.    Diagnosis: Imaging abnormalities  Assessment and Plan of Treatment: You had a CT of the abdomen and pelvis in the hospital to evaluate the blood in your stool. There was an incidental finding of a 1.5cm echo complex in the endometrium, part of the uterus.   -Follow with your GYN within 1 week of discharge to evaluate further evaluation    Diagnosis: Headache  Assessment and Plan of Treatment: During your hospital stay you suffered from headaches and were started on fioricet as needed, and the headaches improved. They were likely migraines that were made worse by your current colitis.  - Follow up with your neurologist within one week of discharge.  - A prescription for Fioricet was sent to your pharmacy. Take one pill as directed if you experience another headache. Follow up with neurologist for further medication.    Diagnosis: Hypothyroidism  Assessment and Plan of Treatment: Continue on levothyroxine 75 mcg daily    Diagnosis: HTN (hypertension)  Assessment and Plan of Treatment: Continue on amlodipine 2.5mg daily  -Check blood pressure regularly  -Follow a diet low in salt and saturated fats    Diagnosis: IBS (irritable bowel syndrome)  Assessment and Plan of Treatment: Continue on bentyl PRINCIPAL DISCHARGE DIAGNOSIS  Diagnosis: Vasovagal syncope  Assessment and Plan of Treatment: You were admitted to the hospital for an episode of syncope, or loss of consciousness. You have a known history of vasovagal syncope related to passing bowel movements, and have been worked up by your cardiologist Dr Samuels.      SECONDARY DISCHARGE DIAGNOSES  Diagnosis: GIB (gastrointestinal bleeding)  Assessment and Plan of Treatment: You were found to have a possible gastro-intestinal bleed due to blood in the stool. You were evaluated by a gastro-enterologist, and the blood in the stool was attributed to colitis. You blood levels were monitored throughout your time in the hospital and were noted to be stable.  -You should follow with your GI in 3-5 days from discharge  -Have a repeat colonoscopy in 6-8 weeks from discharge    Diagnosis: Colitis  Assessment and Plan of Treatment: You were found to have Colitis on CT of the abdomen and pelvis, and were treated with IV antibiotics and improved.  - Continue taking Flagyl 500mg one pill three times per day for two days  - Continue taking Vantin 200mg one pill twice per day for two days  - Continue taking Bacid (probiotic) 1 pill three times per day with meals for 14 days.   -Follow with your GI and have repeat colonoscopy in 6-8 weeks to re-evaluate for any abnormalities.    Diagnosis: Imaging abnormalities  Assessment and Plan of Treatment: You had a CT of the abdomen and pelvis in the hospital to evaluate the blood in your stool. There was an incidental finding of a 1.5cm echo complex in the endometrium, part of the uterus.   -Follow with your GYN within 1 week of discharge to evaluate further evaluation    Diagnosis: Headache  Assessment and Plan of Treatment: During your hospital stay you suffered from headaches and were started on fioricet as needed, and the headaches improved. They were likely migraines that were made worse by your current colitis.  - Follow up with your neurologist within one week of discharge.  - A prescription for Fioricet was sent to your pharmacy. Take one pill as directed if you experience another headache. Follow up with neurologist for further medication.    Diagnosis: Hypothyroidism  Assessment and Plan of Treatment: Continue on levothyroxine 75 mcg daily    Diagnosis: HTN (hypertension)  Assessment and Plan of Treatment: Continue on amlodipine 2.5mg daily  -Check blood pressure regularly  -Follow a diet low in salt and saturated fats    Diagnosis: IBS (irritable bowel syndrome)  Assessment and Plan of Treatment: Continue on bentyl

## 2019-12-11 LAB
ANION GAP SERPL CALC-SCNC: 7 MMOL/L — SIGNIFICANT CHANGE UP (ref 5–17)
BUN SERPL-MCNC: 17 MG/DL — SIGNIFICANT CHANGE UP (ref 7–23)
CALCIUM SERPL-MCNC: 8 MG/DL — LOW (ref 8.5–10.1)
CHLORIDE SERPL-SCNC: 106 MMOL/L — SIGNIFICANT CHANGE UP (ref 96–108)
CO2 SERPL-SCNC: 29 MMOL/L — SIGNIFICANT CHANGE UP (ref 22–31)
CREAT SERPL-MCNC: 1 MG/DL — SIGNIFICANT CHANGE UP (ref 0.5–1.3)
CULTURE RESULTS: SIGNIFICANT CHANGE UP
GLUCOSE SERPL-MCNC: 86 MG/DL — SIGNIFICANT CHANGE UP (ref 70–99)
HCT VFR BLD CALC: 37.1 % — SIGNIFICANT CHANGE UP (ref 34.5–45)
HGB BLD-MCNC: 12.4 G/DL — SIGNIFICANT CHANGE UP (ref 11.5–15.5)
MCHC RBC-ENTMCNC: 31.2 PG — SIGNIFICANT CHANGE UP (ref 27–34)
MCHC RBC-ENTMCNC: 33.4 GM/DL — SIGNIFICANT CHANGE UP (ref 32–36)
MCV RBC AUTO: 93.2 FL — SIGNIFICANT CHANGE UP (ref 80–100)
NRBC # BLD: 0 /100 WBCS — SIGNIFICANT CHANGE UP (ref 0–0)
PLATELET # BLD AUTO: 233 K/UL — SIGNIFICANT CHANGE UP (ref 150–400)
POTASSIUM SERPL-MCNC: 3.6 MMOL/L — SIGNIFICANT CHANGE UP (ref 3.5–5.3)
POTASSIUM SERPL-SCNC: 3.6 MMOL/L — SIGNIFICANT CHANGE UP (ref 3.5–5.3)
RBC # BLD: 3.98 M/UL — SIGNIFICANT CHANGE UP (ref 3.8–5.2)
RBC # FLD: 13.1 % — SIGNIFICANT CHANGE UP (ref 10.3–14.5)
SODIUM SERPL-SCNC: 142 MMOL/L — SIGNIFICANT CHANGE UP (ref 135–145)
SPECIMEN SOURCE: SIGNIFICANT CHANGE UP
WBC # BLD: 4.92 K/UL — SIGNIFICANT CHANGE UP (ref 3.8–10.5)
WBC # FLD AUTO: 4.92 K/UL — SIGNIFICANT CHANGE UP (ref 3.8–10.5)

## 2019-12-11 PROCEDURE — 99233 SBSQ HOSP IP/OBS HIGH 50: CPT

## 2019-12-11 RX ADMIN — CEFTRIAXONE 100 MILLIGRAM(S): 500 INJECTION, POWDER, FOR SOLUTION INTRAMUSCULAR; INTRAVENOUS at 06:13

## 2019-12-11 RX ADMIN — SIMETHICONE 80 MILLIGRAM(S): 80 TABLET, CHEWABLE ORAL at 14:32

## 2019-12-11 RX ADMIN — PANTOPRAZOLE SODIUM 40 MILLIGRAM(S): 20 TABLET, DELAYED RELEASE ORAL at 06:13

## 2019-12-11 RX ADMIN — Medication 100 MILLIGRAM(S): at 06:13

## 2019-12-11 RX ADMIN — Medication 10 MILLIGRAM(S): at 16:39

## 2019-12-11 RX ADMIN — AMLODIPINE BESYLATE 2.5 MILLIGRAM(S): 2.5 TABLET ORAL at 06:13

## 2019-12-11 RX ADMIN — SIMETHICONE 80 MILLIGRAM(S): 80 TABLET, CHEWABLE ORAL at 21:56

## 2019-12-11 RX ADMIN — Medication 1 TABLET(S): at 21:56

## 2019-12-11 RX ADMIN — SIMETHICONE 80 MILLIGRAM(S): 80 TABLET, CHEWABLE ORAL at 06:13

## 2019-12-11 RX ADMIN — Medication 100 MILLIGRAM(S): at 21:56

## 2019-12-11 RX ADMIN — Medication 10 MILLIGRAM(S): at 11:42

## 2019-12-11 RX ADMIN — Medication 1 TABLET(S): at 14:32

## 2019-12-11 RX ADMIN — BUPROPION HYDROCHLORIDE 300 MILLIGRAM(S): 150 TABLET, EXTENDED RELEASE ORAL at 11:41

## 2019-12-11 RX ADMIN — Medication 10 MILLIGRAM(S): at 06:12

## 2019-12-11 RX ADMIN — Medication 1 TABLET(S): at 06:12

## 2019-12-11 RX ADMIN — Medication 100 MILLIGRAM(S): at 14:32

## 2019-12-11 RX ADMIN — Medication 75 MICROGRAM(S): at 06:13

## 2019-12-11 NOTE — DIETITIAN INITIAL EVALUATION ADULT. - OTHER INFO
70 y/o female adm with syncope and collapse, abd pain, GIB, colitis. PMH anxiety, HTN, IBS, hypothyroid. Pt states that she is tolerating low fiber, DASH/TLC diet. First solid meal was last night. BM are not completely normal at this time. BM have improved, not as loose. Provided pt with verbal and written information re: low fiber diet. Pt verbalized understanding. Encouraged pt to ensure adequate fluid intake to help prevent constipation (which pt says tends to happen). Keeping a food journal along with GI symptoms also rx.

## 2019-12-11 NOTE — PROGRESS NOTE ADULT - SUBJECTIVE AND OBJECTIVE BOX
Patient is a 69y old  Female who presents with a chief complaint of GI bleed (11 Dec 2019 11:29)      INTERVAL HPI/OVERNIGHT EVENTS: Patient seen and examined at bedside. No overnight events occurred. Patient has no complaints at this time. Her last bowel movement was yesterday, denies blood in stool. She has a healthy appetite - eating regularly since yesterday morning without complication.  Her headache is completely resolved. Denies fevers, chills, lightheadedness, chest pain, dyspnea, abdominal pain, n/v/d/c.    MEDICATIONS  (STANDING):  amLODIPine   Tablet 2.5 milliGRAM(s) Oral daily  buPROPion XL . 300 milliGRAM(s) Oral daily  cefTRIAXone   IVPB 2000 milliGRAM(s) IV Intermittent every 24 hours  dicyclomine 10 milliGRAM(s) Oral three times a day before meals  lactobacillus acidophilus 1 Tablet(s) Oral three times a day  levothyroxine 75 MICROGram(s) Oral daily  metroNIDAZOLE  IVPB      metroNIDAZOLE  IVPB 500 milliGRAM(s) IV Intermittent every 8 hours  pantoprazole    Tablet 40 milliGRAM(s) Oral before breakfast  simethicone 80 milliGRAM(s) Chew three times a day    MEDICATIONS  (PRN):  acetaminophen   Tablet .. 650 milliGRAM(s) Oral every 6 hours PRN Temp greater or equal to 38C (100.4F), Mild Pain (1 - 3)  acetaminophen 300 mG/butalbital 50 mG/ caffeine 40 mG 1 Capsule(s) Oral every 8 hours PRN migraine  morphine  - Injectable 1 milliGRAM(s) IV Push every 4 hours PRN Severe Pain (7 - 10)  ondansetron Injectable 4 milliGRAM(s) IV Push every 6 hours PRN Nausea and/or Vomiting      Allergies    Cipro (Hives)  codeine (Vomiting)    Intolerances    tramadol (Vomiting)      REVIEW OF SYSTEMS:  CONSTITUTIONAL: No fever or chills  HEENT:  No headache, no sore throat  RESPIRATORY: No cough, wheezing, or shortness of breath  CARDIOVASCULAR: No chest pain, palpitations  GASTROINTESTINAL: No abd pain, nausea, vomiting, or diarrhea, constipation  GENITOURINARY: No dysuria, frequency, or hematuria  NEUROLOGICAL: no focal weakness or dizziness  MUSCULOSKELETAL: no myalgias     Vital Signs Last 24 Hrs  T(C): 36.8 (11 Dec 2019 11:29), Max: 37 (10 Dec 2019 20:20)  T(F): 98.3 (11 Dec 2019 11:29), Max: 98.6 (10 Dec 2019 20:20)  HR: 80 (11 Dec 2019 11:29) (71 - 86)  BP: 123/84 (11 Dec 2019 11:29) (110/72 - 126/85)  BP(mean): --  RR: 14 (11 Dec 2019 11:29) (14 - 16)  SpO2: 97% (11 Dec 2019 11:29) (95% - 98%)    PHYSICAL EXAM:  GENERAL: NAD, resting comfortably in chair  HEENT:  anicteric, moist mucous membranes  CHEST/LUNG:  CTA b/l, no rales, wheezes, or rhonchi  HEART:  RRR, S1, S2  ABDOMEN:  BS+, soft, nontender, nondistended  EXTREMITIES: no edema, cyanosis, or calf tenderness  NERVOUS SYSTEM: answers questions and follows commands appropriately. Strength grossly normal.     LABS:                        12.4   4.92  )-----------( 233      ( 11 Dec 2019 06:55 )             37.1     CBC Full  -  ( 11 Dec 2019 06:55 )  WBC Count : 4.92 K/uL  Hemoglobin : 12.4 g/dL  Hematocrit : 37.1 %  Platelet Count - Automated : 233 K/uL  Mean Cell Volume : 93.2 fl  Mean Cell Hemoglobin : 31.2 pg  Mean Cell Hemoglobin Concentration : 33.4 gm/dL  Auto Neutrophil # : x  Auto Lymphocyte # : x  Auto Monocyte # : x  Auto Eosinophil # : x  Auto Basophil # : x  Auto Neutrophil % : x  Auto Lymphocyte % : x  Auto Monocyte % : x  Auto Eosinophil % : x  Auto Basophil % : x    11 Dec 2019 06:55    142    |  106    |  17     ----------------------------<  86     3.6     |  29     |  1.00     Ca    8.0        11 Dec 2019 06:55          CAPILLARY BLOOD GLUCOSE              RADIOLOGY & ADDITIONAL TESTS:    Personally reviewed.     Consultant(s) Notes Reviewed:  [x] YES  [ ] NO

## 2019-12-11 NOTE — DIETITIAN INITIAL EVALUATION ADULT. - PROBLEM SELECTOR PLAN 3
-Patient with evidence of uncomplicated colitis on CT  - ischemic vs infectious   - no leukocytosis. afebrile   - cover with rocephin and flagyl for now awaiting GI Eval

## 2019-12-11 NOTE — PROGRESS NOTE ADULT - PROBLEM SELECTOR PLAN 1
- Suspect secondary to ischemia/vascular compromise to bowels vs colitis.   - Patient describes severe abdominal pain 30-60 minutes after eating meals  - CT with findings of uncomplicated colitis  - last EGD/colonoscopy 2 years ago wnl  - Low fiber diet  - Continue bentyl, takes TID around the clock at home not PRN.   - Zofran PRN for nausea   - GI (Sidiridis) following  - H&H stable  - As per patient, no blood seen in last bowel movement (yesterday)

## 2019-12-11 NOTE — DIETITIAN INITIAL EVALUATION ADULT. - PROBLEM SELECTOR PLAN 1
-Patient with bright red blood per rectum since last night, no known history of GI bleeds previously  -ischemic vs infectious colitis vs ibs flare  - CT with findings of uncomplicated colitis  -pt with no gib with previous ibs flares  -last EGD/colonoscopy 2 years ago wnl  -patient had two more episodes of BRBPR in ED (about 5-10 mL with some small amount of stool)  -Admit to telemetry   - NPO   - type and screen, H&H currently 14/42.5, check stat H&H , check q 6 hours while actively bleeding.   - transfuse PRN for significant bleeding or hemodynamic instability.  -s/p 3 L NS bolus in ED , c/w NS @ 75cc x 12 hrs  -Pain control: s/p Morphine 2 mg IVP x 1. hold for now. restart bentyl. add morphine if pain inadequately controlled.   -Zofran PRN for nausea   -H/H: 14/42.5, unknown baseline, patient is hemodynamically stable   -Patient may need colonoscopy/EGD  -GI (Jorge) consulted; f/u recs

## 2019-12-11 NOTE — PROGRESS NOTE ADULT - SUBJECTIVE AND OBJECTIVE BOX
INTERVAL HPI/OVERNIGHT EVENTS:  pt seen and examined  feeling better today  +loose, nonbloody BM x 1 today  abd pain resolved; denies n/v  tolerating low-fiber diet; denies post-prandial pain after breakfast or lunch      MEDICATIONS  (STANDING):  amLODIPine   Tablet 2.5 milliGRAM(s) Oral daily  buPROPion XL . 300 milliGRAM(s) Oral daily  cefTRIAXone   IVPB 2000 milliGRAM(s) IV Intermittent every 24 hours  dicyclomine 10 milliGRAM(s) Oral three times a day before meals  lactobacillus acidophilus 1 Tablet(s) Oral three times a day  levothyroxine 75 MICROGram(s) Oral daily  metroNIDAZOLE  IVPB      metroNIDAZOLE  IVPB 500 milliGRAM(s) IV Intermittent every 8 hours  pantoprazole    Tablet 40 milliGRAM(s) Oral before breakfast  simethicone 80 milliGRAM(s) Chew three times a day    MEDICATIONS  (PRN):  acetaminophen   Tablet .. 650 milliGRAM(s) Oral every 6 hours PRN Temp greater or equal to 38C (100.4F), Mild Pain (1 - 3)  acetaminophen 300 mG/butalbital 50 mG/ caffeine 40 mG 1 Capsule(s) Oral every 8 hours PRN migraine  morphine  - Injectable 1 milliGRAM(s) IV Push every 4 hours PRN Severe Pain (7 - 10)  ondansetron Injectable 4 milliGRAM(s) IV Push every 6 hours PRN Nausea and/or Vomiting      Allergies    Cipro (Hives)  codeine (Vomiting)    Intolerances    tramadol (Vomiting)      Review of Systems:    General:  No wt loss, fevers, chills, night sweats, fatigue   Eyes:  Good vision, no reported pain  ENT:  No sore throat, pain, runny nose, dysphagia  CV:  No pain, palpitations, hypo/hypertension  Resp:  No dyspnea, cough, tachypnea, wheezing  GI:  No pain, No nausea, No vomiting, No diarrhea, No constipation, No weight loss, No fever, No pruritis, No rectal bleeding, No melena, No dysphagia  :  No pain, bleeding, incontinence, nocturia  Muscle:  No pain, weakness  Neuro:  No weakness, tingling, memory problems  Psych:  No fatigue, insomnia, mood problems, depression  Endocrine:  No polyuria, polydypsia, cold/heat intolerance  Heme:  No petechiae, ecchymosis, easy bruisability  Skin:  No rash, tattoos, scars, edema      Vital Signs Last 24 Hrs  T(C): 36.7 (10 Dec 2019 08:16), Max: 36.9 (10 Dec 2019 04:25)  T(F): 98 (10 Dec 2019 08:16), Max: 98.4 (10 Dec 2019 04:25)  HR: 68 (10 Dec 2019 08:16) (66 - 79)  BP: 126/78 (10 Dec 2019 08:16) (112/70 - 143/73)  BP(mean): --  RR: 16 (10 Dec 2019 08:16) (16 - 19)  SpO2: 94% (10 Dec 2019 08:16) (93% - 97%)    PHYSICAL EXAM:    Constitutional: lying in bed  HEENT: ncat  Neck: No LAD  Gastrointestinal: soft nt nd  Extremities: No peripheral edema  Neurological: A/O x 3      LABS:                        12.8   5.13  )-----------( 249      ( 10 Dec 2019 07:14 )             38.2     12-10    147<H>  |  111<H>  |  12  ----------------------------<  90  3.6   |  30  |  1.00    Ca    8.5      10 Dec 2019 07:14            RADIOLOGY & ADDITIONAL TESTS:

## 2019-12-11 NOTE — PROGRESS NOTE ADULT - PROBLEM SELECTOR PLAN 4
-Patient with evidence of uncomplicated colitis on CT  - possibly due to vascular compromise. Patient describes severe pain 30-60 minutes after eating.  - Continue antibiotics for now

## 2019-12-11 NOTE — PROGRESS NOTE ADULT - PROBLEM SELECTOR PLAN 1
clinically improving  pt reports frequently experiencing post-prandial abdominal pain after eating meals; today has not experienced with low-fiber diet   could be 2/2 vascular compromise despite normal lactate   gi pcr sent  cont abx  cont bacid, bentyl  cont simethicone tid  monitor exam/gi fxn  will need op colonoscopy in 6-8 weeks once improved

## 2019-12-11 NOTE — PROGRESS NOTE ADULT - ATTENDING COMMENTS
Advance diet as tolerated  Gentle hydration  Monitor BP  Continue IV antibiotics  GI f/u  Ambulate as tolerated
Advance diet as tolerated  Headache has been improving.  Ambulate as tolerated  DC planning for am on oral antibiotics if symptoms improved, headache
Advance diet as tolerated  Intractable headache, suspect secondary to Migraine. Added Fioricet today PRN. CT head negative. Neuro to see patient. BP better.  Ambulate as tolerated
Patient seen and examined with medicine team and resident.  Doing well today and tolerating her diet.  She endorses frequent post prandial abdominal angina type symptoms.  Already had normal colonoscopy 2 years ago.  Will need outpatient colonoscopy in 6-8 weeks after resolution of colitis here which is most likely to be ischemic colitis in nature. Will reach out to GI if patient should have any further testing with angiogram inpatient and if not can be discharged home to follow up with GI outpatient.

## 2019-12-11 NOTE — PROGRESS NOTE ADULT - SUBJECTIVE AND OBJECTIVE BOX
Neurology follow up note    TREASURE [id number]      Interval History:    Patient feels ok no new complaints.    MEDICATIONS    acetaminophen   Tablet .. 650 milliGRAM(s) Oral every 6 hours PRN  acetaminophen 300 mG/butalbital 50 mG/ caffeine 40 mG 1 Capsule(s) Oral every 8 hours PRN  amLODIPine   Tablet 2.5 milliGRAM(s) Oral daily  buPROPion XL . 300 milliGRAM(s) Oral daily  cefTRIAXone   IVPB 2000 milliGRAM(s) IV Intermittent every 24 hours  dicyclomine 10 milliGRAM(s) Oral three times a day before meals  lactobacillus acidophilus 1 Tablet(s) Oral three times a day  levothyroxine 75 MICROGram(s) Oral daily  metroNIDAZOLE  IVPB      metroNIDAZOLE  IVPB 500 milliGRAM(s) IV Intermittent every 8 hours  morphine  - Injectable 1 milliGRAM(s) IV Push every 4 hours PRN  ondansetron Injectable 4 milliGRAM(s) IV Push every 6 hours PRN  pantoprazole    Tablet 40 milliGRAM(s) Oral before breakfast  simethicone 80 milliGRAM(s) Chew three times a day      Allergies    Cipro (Hives)  codeine (Vomiting)    Intolerances    tramadol (Vomiting)          Vital Signs Last 24 Hrs  T(C): 36.8 (11 Dec 2019 08:00), Max: 37.1 (10 Dec 2019 11:39)  T(F): 98.3 (11 Dec 2019 08:00), Max: 98.8 (10 Dec 2019 11:39)  HR: 71 (11 Dec 2019 08:00) (66 - 86)  BP: 119/81 (11 Dec 2019 08:00) (108/75 - 126/85)  BP(mean): --  RR: 15 (11 Dec 2019 08:00) (15 - 16)  SpO2: 97% (11 Dec 2019 08:00) (95% - 98%)    REVIEW OF SYSTEMS:     Constitutional: No fever, chills, fatigue, weakness  Eyes: no eye pain, visual disturbances, or discharge  ENT:  No difficulty hearing, tinnitus, vertigo; No sinus or throat pain  Neck: No pain or stiffness  Respiratory: No cough, dyspnea, wheezing   Cardiovascular: No chest pain, palpitations,   Gastrointestinal: No abdominal or epigastric pain. No nausea, vomiting  No diarrhea or constipation.   Genitourinary: No dysuria, frequency, hematuria or incontinence  Neurological: No headaches, lightheadedness, vertigo, numbness or tremors  Psychiatric: No depression, anxiety, mood swings or difficulty sleeping  Musculoskeletal: No joint pain or swelling; No muscle, back or extremity pain  Skin: No itching, burning, rashes or lesions   Lymph Nodes: No enlarged glands  Endocrine: No heat or cold intolerance; No hair loss   Allergy and Immunologic: No hives or eczema    On Neurological Examination:     The patient is awake, alert, oriented x3.  Extraocular movements were intact.  Pupils equal, round, and reactive bilaterally 3 mm to 2.  Speech was fluent.  Smile symmetric.  Motor, bilateral upper and lower 5/5.  Sensory:  Bilateral upper and lower intact to light touch.  No drift.    Follow simple commands  Follow complex commands    GENERAL Exam: Nontoxic , No Acute Distress   	  HEENT:  normocephalic, atraumatic  		  LUNGS: Clear bilaterally      HEART: Normal S1S2   No murmur RRR        	  GI/ ABDOMEN:  Soft  Non tender    EXTREMITIES:   No Edema  No Clubbing  No Cyanosis     MUSCULOSKELETAL: Normal Range of Motion   	   SKIN: Normal  No Ecchymosis               LABS:  CBC Full  -  ( 11 Dec 2019 06:55 )  WBC Count : 4.92 K/uL  RBC Count : 3.98 M/uL  Hemoglobin : 12.4 g/dL  Hematocrit : 37.1 %  Platelet Count - Automated : 233 K/uL  Mean Cell Volume : 93.2 fl  Mean Cell Hemoglobin : 31.2 pg  Mean Cell Hemoglobin Concentration : 33.4 gm/dL  Auto Neutrophil # : x  Auto Lymphocyte # : x  Auto Monocyte # : x  Auto Eosinophil # : x  Auto Basophil # : x  Auto Neutrophil % : x  Auto Lymphocyte % : x  Auto Monocyte % : x  Auto Eosinophil % : x  Auto Basophil % : x      12-11    142  |  106  |  17  ----------------------------<  86  3.6   |  29  |  1.00    Ca    8.0<L>      11 Dec 2019 06:55      Hemoglobin A1C:       Vitamin B12         RADIOLOGY    ANALYSIS AND PLAN:  This is a 69-year-old with headaches.  1.	For headaches, most likely secondary to migraine exacerbation secondary to general medical issues.  Secondary to patient having underlying GI bleed for now, I will hold off on nonsteroidals.  The patient had received Fioricet which seems to be helping with her headaches.  For now I will continue the patient on her Fioricet as needed  2.	overall doing better headache wise   3.	Monitor hemoglobin/hematocrit.  4.	GI workup as needed.  5.	For history of hypothyroidism, continue the patient on Synthroid.  6.	Please give script fo few tablets of Fioricet upon dc  7.	out patient follow up   8.	Spoke with the patient's spouse in great detail in past   They understand my reasoning and thought process.    Greater than 31 minutes spent in direct patient care reviewing  the notes, lab data/ imaging , discussion with multidisciplinary team.

## 2019-12-11 NOTE — PROGRESS NOTE ADULT - PROBLEM SELECTOR PLAN 2
Patient with severe headache during admission. Received morphine but headache persisted. Resolved with Fioricet.  - Continue Fioricet Q8H PRN  - CT head showed no acute pathology  - Neurology consulted, Dr. Cruz. Likely a migraine exacerbation. Continue Fioricet PRN

## 2019-12-11 NOTE — DIETITIAN INITIAL EVALUATION ADULT. - PROBLEM SELECTOR PLAN 2
Patient with history of vasovagal syncope with bowel movements, has followed up with her cardiologist Dr Mario for workup of vasovagal syncope   -Syncope with this episode is most likely vasovagal, as it occurred while patient was straining herself while having a bowel movement, patient also with prodromal symptoms   -EKG wnl, Cardiac enzymes negative x 1, patient denies anginal symptoms: unlikely ischemic event   -Monitor for signs of ischemia  - fall precautions, oob with assist

## 2019-12-11 NOTE — CHART NOTE - NSCHARTNOTEFT_GEN_A_CORE
Called by RN for bump of patient's left arm at IV site. Pt seen and examined at bedside.    Patient states she noticed small raised area of swelling after she woke up from a nap where her LUE was bent under her head. Patient states the area is not painful, but feels mildly itchy. Denies any latex or tape allergies. IV is flushing with good blood return per RN. Peripheral pulses are intact, no changes in sensation of LUE.     Vitals  Vital Signs Last 24 Hrs  T(C): 36.9 (11 Dec 2019 00:32), Max: 37.1 (10 Dec 2019 11:39)  T(F): 98.4 (11 Dec 2019 00:32), Max: 98.8 (10 Dec 2019 11:39)  HR: 86 (11 Dec 2019 00:32) (66 - 86)  BP: 126/84 (11 Dec 2019 00:32) (108/75 - 126/85)  RR: 16 (11 Dec 2019 00:32) (16 - 19)  SpO2: 97% (11 Dec 2019 00:32) (94% - 98%)      General: Resting comfortably in bed, NAD  Extremities: Small non-erythematous swollen nodule just proximal to IV site in left antecubital fossa, non-tender to palpation; no surrounding erythema, ecchymosis, or discharge noted  Neurology: A&Ox3, nonfocal       LABS:                        12.8   5.13  )-----------( 249      ( 10 Dec 2019 07:14 )             38.2     12-10    147<H>  |  111<H>  |  12  ----------------------------<  90  3.6   |  30  |  1.00    Ca    8.5      10 Dec 2019 07:14                  A/P:   - RN to remove IV and replace in a more distal position of LUE. Pt requesting IV not to be in her dominant arm.  - Elevation of LUE and ice pack over site  - Will continue to monitor  - RN to notify with changes

## 2019-12-12 ENCOUNTER — TRANSCRIPTION ENCOUNTER (OUTPATIENT)
Age: 69
End: 2019-12-12

## 2019-12-12 VITALS
OXYGEN SATURATION: 97 % | DIASTOLIC BLOOD PRESSURE: 75 MMHG | RESPIRATION RATE: 17 BRPM | TEMPERATURE: 98 F | HEART RATE: 85 BPM | SYSTOLIC BLOOD PRESSURE: 139 MMHG

## 2019-12-12 LAB
ALBUMIN SERPL ELPH-MCNC: 3.1 G/DL — LOW (ref 3.3–5)
ALP SERPL-CCNC: 66 U/L — SIGNIFICANT CHANGE UP (ref 40–120)
ALT FLD-CCNC: 49 U/L — SIGNIFICANT CHANGE UP (ref 12–78)
ANION GAP SERPL CALC-SCNC: 7 MMOL/L — SIGNIFICANT CHANGE UP (ref 5–17)
AST SERPL-CCNC: 65 U/L — HIGH (ref 15–37)
BASOPHILS # BLD AUTO: 0.04 K/UL — SIGNIFICANT CHANGE UP (ref 0–0.2)
BASOPHILS NFR BLD AUTO: 0.8 % — SIGNIFICANT CHANGE UP (ref 0–2)
BILIRUB SERPL-MCNC: 0.3 MG/DL — SIGNIFICANT CHANGE UP (ref 0.2–1.2)
BUN SERPL-MCNC: 16 MG/DL — SIGNIFICANT CHANGE UP (ref 7–23)
CALCIUM SERPL-MCNC: 8.4 MG/DL — LOW (ref 8.5–10.1)
CHLORIDE SERPL-SCNC: 107 MMOL/L — SIGNIFICANT CHANGE UP (ref 96–108)
CO2 SERPL-SCNC: 28 MMOL/L — SIGNIFICANT CHANGE UP (ref 22–31)
CREAT SERPL-MCNC: 0.95 MG/DL — SIGNIFICANT CHANGE UP (ref 0.5–1.3)
EOSINOPHIL # BLD AUTO: 0.29 K/UL — SIGNIFICANT CHANGE UP (ref 0–0.5)
EOSINOPHIL NFR BLD AUTO: 5.7 % — SIGNIFICANT CHANGE UP (ref 0–6)
GLUCOSE SERPL-MCNC: 89 MG/DL — SIGNIFICANT CHANGE UP (ref 70–99)
HCT VFR BLD CALC: 37.7 % — SIGNIFICANT CHANGE UP (ref 34.5–45)
HGB BLD-MCNC: 12.6 G/DL — SIGNIFICANT CHANGE UP (ref 11.5–15.5)
IMM GRANULOCYTES NFR BLD AUTO: 0.4 % — SIGNIFICANT CHANGE UP (ref 0–1.5)
LYMPHOCYTES # BLD AUTO: 1.36 K/UL — SIGNIFICANT CHANGE UP (ref 1–3.3)
LYMPHOCYTES # BLD AUTO: 26.7 % — SIGNIFICANT CHANGE UP (ref 13–44)
MCHC RBC-ENTMCNC: 31.2 PG — SIGNIFICANT CHANGE UP (ref 27–34)
MCHC RBC-ENTMCNC: 33.4 GM/DL — SIGNIFICANT CHANGE UP (ref 32–36)
MCV RBC AUTO: 93.3 FL — SIGNIFICANT CHANGE UP (ref 80–100)
MONOCYTES # BLD AUTO: 0.62 K/UL — SIGNIFICANT CHANGE UP (ref 0–0.9)
MONOCYTES NFR BLD AUTO: 12.2 % — SIGNIFICANT CHANGE UP (ref 2–14)
NEUTROPHILS # BLD AUTO: 2.77 K/UL — SIGNIFICANT CHANGE UP (ref 1.8–7.4)
NEUTROPHILS NFR BLD AUTO: 54.2 % — SIGNIFICANT CHANGE UP (ref 43–77)
NRBC # BLD: 0 /100 WBCS — SIGNIFICANT CHANGE UP (ref 0–0)
PLATELET # BLD AUTO: 265 K/UL — SIGNIFICANT CHANGE UP (ref 150–400)
POTASSIUM SERPL-MCNC: 4.1 MMOL/L — SIGNIFICANT CHANGE UP (ref 3.5–5.3)
POTASSIUM SERPL-SCNC: 4.1 MMOL/L — SIGNIFICANT CHANGE UP (ref 3.5–5.3)
PROT SERPL-MCNC: 5.8 G/DL — LOW (ref 6–8.3)
RBC # BLD: 4.04 M/UL — SIGNIFICANT CHANGE UP (ref 3.8–5.2)
RBC # FLD: 13.1 % — SIGNIFICANT CHANGE UP (ref 10.3–14.5)
SODIUM SERPL-SCNC: 142 MMOL/L — SIGNIFICANT CHANGE UP (ref 135–145)
WBC # BLD: 5.1 K/UL — SIGNIFICANT CHANGE UP (ref 3.8–10.5)
WBC # FLD AUTO: 5.1 K/UL — SIGNIFICANT CHANGE UP (ref 3.8–10.5)

## 2019-12-12 PROCEDURE — 86900 BLOOD TYPING SEROLOGIC ABO: CPT

## 2019-12-12 PROCEDURE — 85730 THROMBOPLASTIN TIME PARTIAL: CPT

## 2019-12-12 PROCEDURE — 85610 PROTHROMBIN TIME: CPT

## 2019-12-12 PROCEDURE — 86850 RBC ANTIBODY SCREEN: CPT

## 2019-12-12 PROCEDURE — 85018 HEMOGLOBIN: CPT

## 2019-12-12 PROCEDURE — 99285 EMERGENCY DEPT VISIT HI MDM: CPT | Mod: 25

## 2019-12-12 PROCEDURE — 86901 BLOOD TYPING SEROLOGIC RH(D): CPT

## 2019-12-12 PROCEDURE — 82553 CREATINE MB FRACTION: CPT

## 2019-12-12 PROCEDURE — 93005 ELECTROCARDIOGRAM TRACING: CPT

## 2019-12-12 PROCEDURE — 86803 HEPATITIS C AB TEST: CPT

## 2019-12-12 PROCEDURE — 99239 HOSP IP/OBS DSCHRG MGMT >30: CPT

## 2019-12-12 PROCEDURE — 85027 COMPLETE CBC AUTOMATED: CPT

## 2019-12-12 PROCEDURE — 83690 ASSAY OF LIPASE: CPT

## 2019-12-12 PROCEDURE — 87507 IADNA-DNA/RNA PROBE TQ 12-25: CPT

## 2019-12-12 PROCEDURE — 85014 HEMATOCRIT: CPT

## 2019-12-12 PROCEDURE — 71045 X-RAY EXAM CHEST 1 VIEW: CPT

## 2019-12-12 PROCEDURE — 80053 COMPREHEN METABOLIC PANEL: CPT

## 2019-12-12 PROCEDURE — 84484 ASSAY OF TROPONIN QUANT: CPT

## 2019-12-12 PROCEDURE — 36415 COLL VENOUS BLD VENIPUNCTURE: CPT

## 2019-12-12 PROCEDURE — 83605 ASSAY OF LACTIC ACID: CPT

## 2019-12-12 PROCEDURE — 74177 CT ABD & PELVIS W/CONTRAST: CPT

## 2019-12-12 PROCEDURE — 70450 CT HEAD/BRAIN W/O DYE: CPT

## 2019-12-12 PROCEDURE — 82550 ASSAY OF CK (CPK): CPT

## 2019-12-12 PROCEDURE — 80048 BASIC METABOLIC PNL TOTAL CA: CPT

## 2019-12-12 RX ORDER — CEFPODOXIME PROXETIL 100 MG
1 TABLET ORAL
Qty: 4 | Refills: 0
Start: 2019-12-12 | End: 2019-12-13

## 2019-12-12 RX ORDER — METRONIDAZOLE 500 MG
1 TABLET ORAL
Qty: 6 | Refills: 0
Start: 2019-12-12 | End: 2019-12-13

## 2019-12-12 RX ORDER — METRONIDAZOLE 500 MG
500 TABLET ORAL ONCE
Refills: 0 | Status: COMPLETED | OUTPATIENT
Start: 2019-12-12 | End: 2019-12-12

## 2019-12-12 RX ORDER — LACTOBACILLUS ACIDOPHILUS 100MM CELL
1 CAPSULE ORAL
Qty: 9 | Refills: 0
Start: 2019-12-12 | End: 2019-12-14

## 2019-12-12 RX ORDER — LACTOBACILLUS ACIDOPHILUS 100MM CELL
1 CAPSULE ORAL
Qty: 42 | Refills: 0
Start: 2019-12-12 | End: 2019-12-25

## 2019-12-12 RX ORDER — ONDANSETRON 8 MG/1
1 TABLET, FILM COATED ORAL
Qty: 9 | Refills: 0
Start: 2019-12-12 | End: 2019-12-14

## 2019-12-12 RX ORDER — ONDANSETRON 8 MG/1
4 TABLET, FILM COATED ORAL ONCE
Refills: 0 | Status: COMPLETED | OUTPATIENT
Start: 2019-12-12 | End: 2019-12-12

## 2019-12-12 RX ADMIN — BUPROPION HYDROCHLORIDE 300 MILLIGRAM(S): 150 TABLET, EXTENDED RELEASE ORAL at 12:35

## 2019-12-12 RX ADMIN — Medication 100 MILLIGRAM(S): at 05:19

## 2019-12-12 RX ADMIN — Medication 10 MILLIGRAM(S): at 12:35

## 2019-12-12 RX ADMIN — Medication 75 MICROGRAM(S): at 05:19

## 2019-12-12 RX ADMIN — Medication 1 TABLET(S): at 12:36

## 2019-12-12 RX ADMIN — CEFTRIAXONE 100 MILLIGRAM(S): 500 INJECTION, POWDER, FOR SOLUTION INTRAMUSCULAR; INTRAVENOUS at 05:19

## 2019-12-12 RX ADMIN — PANTOPRAZOLE SODIUM 40 MILLIGRAM(S): 20 TABLET, DELAYED RELEASE ORAL at 07:02

## 2019-12-12 RX ADMIN — AMLODIPINE BESYLATE 2.5 MILLIGRAM(S): 2.5 TABLET ORAL at 05:25

## 2019-12-12 RX ADMIN — Medication 500 MILLIGRAM(S): at 14:23

## 2019-12-12 RX ADMIN — Medication 1 TABLET(S): at 05:19

## 2019-12-12 RX ADMIN — SIMETHICONE 80 MILLIGRAM(S): 80 TABLET, CHEWABLE ORAL at 14:24

## 2019-12-12 RX ADMIN — ONDANSETRON 4 MILLIGRAM(S): 8 TABLET, FILM COATED ORAL at 14:22

## 2019-12-12 RX ADMIN — SIMETHICONE 80 MILLIGRAM(S): 80 TABLET, CHEWABLE ORAL at 05:19

## 2019-12-12 RX ADMIN — Medication 10 MILLIGRAM(S): at 05:25

## 2019-12-12 NOTE — PROGRESS NOTE ADULT - REASON FOR ADMISSION
GI bleed

## 2019-12-12 NOTE — PROGRESS NOTE ADULT - SUBJECTIVE AND OBJECTIVE BOX
INTERVAL HPI/OVERNIGHT EVENTS:  pt seen and examined  denies n/v/d/c/abd pain  reporets +non bloody bm  tolerating po, denies pp pain    MEDICATIONS  (STANDING):  amLODIPine   Tablet 2.5 milliGRAM(s) Oral daily  buPROPion XL . 300 milliGRAM(s) Oral daily  cefTRIAXone   IVPB 2000 milliGRAM(s) IV Intermittent every 24 hours  dicyclomine 10 milliGRAM(s) Oral three times a day before meals  lactobacillus acidophilus 1 Tablet(s) Oral three times a day  levothyroxine 75 MICROGram(s) Oral daily  metroNIDAZOLE  IVPB      metroNIDAZOLE  IVPB 500 milliGRAM(s) IV Intermittent every 8 hours  pantoprazole    Tablet 40 milliGRAM(s) Oral before breakfast  simethicone 80 milliGRAM(s) Chew three times a day    MEDICATIONS  (PRN):  acetaminophen   Tablet .. 650 milliGRAM(s) Oral every 6 hours PRN Temp greater or equal to 38C (100.4F), Mild Pain (1 - 3)  acetaminophen 300 mG/butalbital 50 mG/ caffeine 40 mG 1 Capsule(s) Oral every 8 hours PRN migraine  morphine  - Injectable 1 milliGRAM(s) IV Push every 4 hours PRN Severe Pain (7 - 10)  ondansetron Injectable 4 milliGRAM(s) IV Push every 6 hours PRN Nausea and/or Vomiting      Allergies    Cipro (Hives)  codeine (Vomiting)    Intolerances    tramadol (Vomiting)      Review of Systems:    General:  No wt loss, fevers, chills, night sweats, fatigue   Eyes:  Good vision, no reported pain  ENT:  No sore throat, pain, runny nose, dysphagia  CV:  No pain, palpitations, hypo/hypertension  Resp:  No dyspnea, cough, tachypnea, wheezing  GI:  No pain, No nausea, No vomiting, No diarrhea, No constipation, No weight loss, No fever, No pruritis, No rectal bleeding, No melena, No dysphagia  :  No pain, bleeding, incontinence, nocturia  Muscle:  No pain, weakness  Neuro:  No weakness, tingling, memory problems  Psych:  No fatigue, insomnia, mood problems, depression  Endocrine:  No polyuria, polydypsia, cold/heat intolerance  Heme:  No petechiae, ecchymosis, easy bruisability  Skin:  No rash, tattoos, scars, edema      Vital Signs Last 24 Hrs  T(C): 37 (12 Dec 2019 07:29), Max: 37 (12 Dec 2019 07:29)  T(F): 98.6 (12 Dec 2019 07:29), Max: 98.6 (12 Dec 2019 07:29)  HR: 82 (12 Dec 2019 07:29) (70 - 84)  BP: 148/80 (12 Dec 2019 07:29) (105/68 - 148/80)  BP(mean): --  RR: 17 (12 Dec 2019 07:29) (14 - 17)  SpO2: 98% (12 Dec 2019 07:29) (94% - 98%)    PHYSICAL EXAM:    Constitutional: lying in bed  HEENT: ncat  Neck: No LAD  Gastrointestinal: soft nt nd  Extremities: No peripheral edema  Neurological: A/O x 3          LABS:                        12.6   5.10  )-----------( 265      ( 12 Dec 2019 06:05 )             37.7     12-12    142  |  107  |  16  ----------------------------<  89  4.1   |  28  |  0.95    Ca    8.4<L>      12 Dec 2019 06:05    TPro  5.8<L>  /  Alb  3.1<L>  /  TBili  0.3  /  DBili  x   /  AST  65<H>  /  ALT  49  /  AlkPhos  66  12-12          RADIOLOGY & ADDITIONAL TESTS:

## 2019-12-12 NOTE — PROGRESS NOTE ADULT - PROBLEM SELECTOR PLAN 1
gi pcr +epec  clinically improved  cont abx  cont bacid, bentyl  cont simethicone tid  monitor exam/gi fxn  diet as tolerated  will need close op gi f/u upon dc, including colonoscopy in 6-8 weeks once improved; dw pt and agreeable; dc planning

## 2019-12-12 NOTE — PROGRESS NOTE ADULT - SUBJECTIVE AND OBJECTIVE BOX
Neurology follow up note    TREASURE [id number]      Interval History:    Patient feels ok no new complaints no headaches     MEDICATIONS    acetaminophen   Tablet .. 650 milliGRAM(s) Oral every 6 hours PRN  acetaminophen 300 mG/butalbital 50 mG/ caffeine 40 mG 1 Capsule(s) Oral every 8 hours PRN  amLODIPine   Tablet 2.5 milliGRAM(s) Oral daily  buPROPion XL . 300 milliGRAM(s) Oral daily  cefTRIAXone   IVPB 2000 milliGRAM(s) IV Intermittent every 24 hours  dicyclomine 10 milliGRAM(s) Oral three times a day before meals  lactobacillus acidophilus 1 Tablet(s) Oral three times a day  levothyroxine 75 MICROGram(s) Oral daily  metroNIDAZOLE  IVPB      metroNIDAZOLE  IVPB 500 milliGRAM(s) IV Intermittent every 8 hours  morphine  - Injectable 1 milliGRAM(s) IV Push every 4 hours PRN  ondansetron Injectable 4 milliGRAM(s) IV Push every 6 hours PRN  pantoprazole    Tablet 40 milliGRAM(s) Oral before breakfast  simethicone 80 milliGRAM(s) Chew three times a day      Allergies    Cipro (Hives)  codeine (Vomiting)    Intolerances    tramadol (Vomiting)          Vital Signs Last 24 Hrs  T(C): 37 (12 Dec 2019 07:29), Max: 37 (12 Dec 2019 07:29)  T(F): 98.6 (12 Dec 2019 07:29), Max: 98.6 (12 Dec 2019 07:29)  HR: 82 (12 Dec 2019 07:29) (70 - 84)  BP: 148/80 (12 Dec 2019 07:29) (105/68 - 148/80)  BP(mean): --  RR: 17 (12 Dec 2019 07:29) (14 - 17)  SpO2: 98% (12 Dec 2019 07:29) (94% - 98%)      REVIEW OF SYSTEMS:     Constitutional: No fever, chills, fatigue, weakness  Eyes: no eye pain, visual disturbances, or discharge  ENT:  No difficulty hearing, tinnitus, vertigo; No sinus or throat pain  Neck: No pain or stiffness  Respiratory: No cough, dyspnea, wheezing   Cardiovascular: No chest pain, palpitations,   Gastrointestinal: No abdominal or epigastric pain. No nausea, vomiting  No diarrhea or constipation.   Genitourinary: No dysuria, frequency, hematuria or incontinence  Neurological: No headaches, lightheadedness, vertigo, numbness or tremors  Psychiatric: No depression, anxiety, mood swings or difficulty sleeping  Musculoskeletal: No joint pain or swelling; No muscle, back or extremity pain  Skin: No itching, burning, rashes or lesions   Lymph Nodes: No enlarged glands  Endocrine: No heat or cold intolerance; No hair loss   Allergy and Immunologic: No hives or eczema    On Neurological Examination:     The patient is awake, alert, oriented x3.  Extraocular movements were intact.  Pupils equal, round, and reactive bilaterally 3 mm to 2.  Speech was fluent.  Smile symmetric.  Motor, bilateral upper and lower 5/5.  Sensory:  Bilateral upper and lower intact to light touch.  No drift.    Follow simple commands  Follow complex commands    GENERAL Exam: Nontoxic , No Acute Distress   	  HEENT:  normocephalic, atraumatic  		  LUNGS: Clear bilaterally      HEART: Normal S1S2   No murmur RRR        	  GI/ ABDOMEN:  Soft  Non tender    EXTREMITIES:   No Edema  No Clubbing  No Cyanosis     MUSCULOSKELETAL: Normal Range of Motion   	   SKIN: Normal  No Ecchymosis             LABS:  CBC Full  -  ( 12 Dec 2019 06:05 )  WBC Count : 5.10 K/uL  RBC Count : 4.04 M/uL  Hemoglobin : 12.6 g/dL  Hematocrit : 37.7 %  Platelet Count - Automated : 265 K/uL  Mean Cell Volume : 93.3 fl  Mean Cell Hemoglobin : 31.2 pg  Mean Cell Hemoglobin Concentration : 33.4 gm/dL  Auto Neutrophil # : 2.77 K/uL  Auto Lymphocyte # : 1.36 K/uL  Auto Monocyte # : 0.62 K/uL  Auto Eosinophil # : 0.29 K/uL  Auto Basophil # : 0.04 K/uL  Auto Neutrophil % : 54.2 %  Auto Lymphocyte % : 26.7 %  Auto Monocyte % : 12.2 %  Auto Eosinophil % : 5.7 %  Auto Basophil % : 0.8 %      12-12    142  |  107  |  16  ----------------------------<  89  4.1   |  28  |  0.95    Ca    8.4<L>      12 Dec 2019 06:05    TPro  5.8<L>  /  Alb  3.1<L>  /  TBili  0.3  /  DBili  x   /  AST  65<H>  /  ALT  49  /  AlkPhos  66  12-12    Hemoglobin A1C:     LIVER FUNCTIONS - ( 12 Dec 2019 06:05 )  Alb: 3.1 g/dL / Pro: 5.8 g/dL / ALK PHOS: 66 U/L / ALT: 49 U/L / AST: 65 U/L / GGT: x           Vitamin B12         RADIOLOGY    ANALYSIS AND PLAN:  This is a 69-year-old with headaches.  1.	For headaches, most likely secondary to migraine exacerbation secondary to general medical issues.  Secondary to patient having underlying GI bleed for now, I will hold off on nonsteroidals.  The patient had received Fioricet which seems to be helping with her headaches.  For now I will continue the patient on her Fioricet as needed  2.	overall doing better headache wise   3.	Monitor hemoglobin/hematocrit.  4.	GI workup as needed.  5.	For history of hypothyroidism, continue the patient on Synthroid.  6.	Please give script fo few tablets of Fioricet upon dc  7.	out patient follow up   8.	no new events   9.	Spoke with the patient's spouse in great detail in past   They understand my reasoning and thought process.    Greater than 26 minutes spent in direct patient care reviewing  the notes, lab data/ imaging , discussion with multidisciplinary team.

## 2019-12-12 NOTE — DISCHARGE NOTE NURSING/CASE MANAGEMENT/SOCIAL WORK - PATIENT PORTAL LINK FT
You can access the FollowMyHealth Patient Portal offered by [facility] by registering at the following website: [email or web address]. By joining [facility]’s FollowMyHealth portal, you will also be able to view your health information using other applications (apps) compatible with our system.

## 2019-12-12 NOTE — PHARMACOTHERAPY INTERVENTION NOTE - COMMENTS
Spoke with patient about any medication issues, side effects or concerns.  She is nervous about taking oral antibiotics at home as she cannot take many without experiencing nausea.    -Says that she prefers antibiotics in capsule form if possible vs tablet.  -Requested that if she goes home with oral antibiotics she would like a prescription for an antiemetic, e.g. ondansetron.  -Will take probiotic and dicyclomine   -Would like to possibly try first oral dose prior to discharge.    (updated her pharmacy to her current [facility])

## 2021-12-23 NOTE — PROGRESS NOTE ADULT - PROBLEM SELECTOR PLAN 8
[FreeTextEntry1] : Ultrasound performed in office today revealed uterus: abnormal 7.8 x 2.8 x 4.8 cm. endometrial thickness 1.1 mm. 1.7 x 1.9 x 1.6 cm intramural vs submucosal? fibroid at posterior wall. trace endo fluid.\par right ovary not seen\par left ovary not seen\par free fluid in pelvis absent. \par \par Discussed with patient that her ultrasound sound today revealed a small fibroid which I am not concerned about at this time and likely the cause of her staining she reported. I am recommending patient follow up with her primary gynecologist to discuss possible hormones. Otherwise patient is doing well from a gynecological stand point and will be discharged from my office today. Patient understands that if she becomes symptomatic she should return to see me. Patient stated she understood and agreed to comply.  -Prominent endometrial echo complex measuring 1.5 cm  -Discuss with patient and advise her to see GYN for further evaluation   - Continued faint hyperdensity along the fundal portion of the gallbladder, likely represent adenomyomatosis  -Consider outpatient workup for gallstones with RUQ U/S, patient currently not c/o symptoms of biliary colic